# Patient Record
Sex: MALE | Race: WHITE | Employment: FULL TIME | URBAN - METROPOLITAN AREA
[De-identification: names, ages, dates, MRNs, and addresses within clinical notes are randomized per-mention and may not be internally consistent; named-entity substitution may affect disease eponyms.]

---

## 2018-05-09 ENCOUNTER — HOSPITAL ENCOUNTER (OUTPATIENT)
Dept: PREADMISSION TESTING | Age: 53
Discharge: HOME | End: 2018-05-13
Payer: COMMERCIAL

## 2018-05-09 VITALS
RESPIRATION RATE: 16 BRPM | TEMPERATURE: 98.3 F | HEART RATE: 72 BPM | HEIGHT: 69 IN | WEIGHT: 201.06 LBS | DIASTOLIC BLOOD PRESSURE: 84 MMHG | BODY MASS INDEX: 29.78 KG/M2 | SYSTOLIC BLOOD PRESSURE: 147 MMHG | OXYGEN SATURATION: 96 %

## 2018-05-09 LAB
ABO/RH: NORMAL
ANTIBODY SCREEN: NEGATIVE
ARM BAND NUMBER: NORMAL
EXPIRATION DATE: NORMAL
HCT VFR BLD CALC: 43.5 % (ref 40.7–50.3)
HEMOGLOBIN: 14.2 G/DL (ref 13–17)
MCH RBC QN AUTO: 28.1 PG (ref 25.2–33.5)
MCHC RBC AUTO-ENTMCNC: 32.6 G/DL (ref 28.4–34.8)
MCV RBC AUTO: 86 FL (ref 82.6–102.9)
NRBC AUTOMATED: 0 PER 100 WBC
PDW BLD-RTO: 12.5 % (ref 11.8–14.4)
PLATELET # BLD: 215 K/UL (ref 138–453)
PMV BLD AUTO: 10.4 FL (ref 8.1–13.5)
RBC # BLD: 5.06 M/UL (ref 4.21–5.77)
WBC # BLD: 7.5 K/UL (ref 3.5–11.3)

## 2018-05-09 PROCEDURE — 86850 RBC ANTIBODY SCREEN: CPT

## 2018-05-09 PROCEDURE — 87086 URINE CULTURE/COLONY COUNT: CPT

## 2018-05-09 PROCEDURE — 85027 COMPLETE CBC AUTOMATED: CPT

## 2018-05-09 PROCEDURE — 86900 BLOOD TYPING SEROLOGIC ABO: CPT

## 2018-05-09 PROCEDURE — 86901 BLOOD TYPING SEROLOGIC RH(D): CPT

## 2018-05-09 PROCEDURE — 36415 COLL VENOUS BLD VENIPUNCTURE: CPT

## 2018-05-09 RX ORDER — SODIUM CHLORIDE, SODIUM LACTATE, POTASSIUM CHLORIDE, CALCIUM CHLORIDE 600; 310; 30; 20 MG/100ML; MG/100ML; MG/100ML; MG/100ML
1000 INJECTION, SOLUTION INTRAVENOUS CONTINUOUS
Status: CANCELLED | OUTPATIENT
Start: 2018-05-09

## 2018-05-10 LAB
CULTURE: NO GROWTH
CULTURE: NORMAL
Lab: NORMAL
SPECIMEN DESCRIPTION: NORMAL
STATUS: NORMAL

## 2018-05-23 ENCOUNTER — ANESTHESIA (OUTPATIENT)
Dept: OPERATING ROOM | Age: 53
DRG: 708 | End: 2018-05-23
Payer: COMMERCIAL

## 2018-05-23 ENCOUNTER — HOSPITAL ENCOUNTER (INPATIENT)
Age: 53
LOS: 1 days | Discharge: HOME | DRG: 708 | End: 2018-05-24
Attending: UROLOGY | Admitting: UROLOGY
Payer: COMMERCIAL

## 2018-05-23 ENCOUNTER — ANESTHESIA EVENT (OUTPATIENT)
Dept: OPERATING ROOM | Age: 53
DRG: 708 | End: 2018-05-23
Payer: COMMERCIAL

## 2018-05-23 VITALS — OXYGEN SATURATION: 100 % | TEMPERATURE: 97.2 F | SYSTOLIC BLOOD PRESSURE: 156 MMHG | DIASTOLIC BLOOD PRESSURE: 95 MMHG

## 2018-05-23 DIAGNOSIS — C61 PROSTATE CANCER (HCC): Primary | ICD-10-CM

## 2018-05-23 LAB
ANION GAP SERPL CALCULATED.3IONS-SCNC: 10 MMOL/L (ref 9–17)
BUN BLDV-MCNC: 15 MG/DL (ref 6–20)
BUN/CREAT BLD: ABNORMAL (ref 9–20)
CALCIUM SERPL-MCNC: 8.8 MG/DL (ref 8.6–10.4)
CHLORIDE BLD-SCNC: 107 MMOL/L (ref 98–107)
CO2: 26 MMOL/L (ref 20–31)
CREAT SERPL-MCNC: 0.81 MG/DL (ref 0.7–1.2)
GFR AFRICAN AMERICAN: >60 ML/MIN
GFR NON-AFRICAN AMERICAN: >60 ML/MIN
GFR SERPL CREATININE-BSD FRML MDRD: ABNORMAL ML/MIN/{1.73_M2}
GFR SERPL CREATININE-BSD FRML MDRD: ABNORMAL ML/MIN/{1.73_M2}
GLUCOSE BLD-MCNC: 113 MG/DL (ref 70–99)
HCT VFR BLD CALC: 42.6 % (ref 40.7–50.3)
HEMOGLOBIN: 14.3 G/DL (ref 13–17)
POTASSIUM SERPL-SCNC: 3.9 MMOL/L (ref 3.7–5.3)
SODIUM BLD-SCNC: 143 MMOL/L (ref 135–144)

## 2018-05-23 PROCEDURE — 87086 URINE CULTURE/COLONY COUNT: CPT

## 2018-05-23 PROCEDURE — 6360000002 HC RX W HCPCS: Performed by: UROLOGY

## 2018-05-23 PROCEDURE — 2500000003 HC RX 250 WO HCPCS: Performed by: NURSE ANESTHETIST, CERTIFIED REGISTERED

## 2018-05-23 PROCEDURE — 8E0W4CZ ROBOTIC ASSISTED PROCEDURE OF TRUNK REGION, PERCUTANEOUS ENDOSCOPIC APPROACH: ICD-10-PCS | Performed by: UROLOGY

## 2018-05-23 PROCEDURE — 80048 BASIC METABOLIC PNL TOTAL CA: CPT

## 2018-05-23 PROCEDURE — 3600000009 HC SURGERY ROBOT BASE: Performed by: UROLOGY

## 2018-05-23 PROCEDURE — 6360000002 HC RX W HCPCS: Performed by: ANESTHESIOLOGY

## 2018-05-23 PROCEDURE — 7100000001 HC PACU RECOVERY - ADDTL 15 MIN: Performed by: UROLOGY

## 2018-05-23 PROCEDURE — 7100000000 HC PACU RECOVERY - FIRST 15 MIN: Performed by: UROLOGY

## 2018-05-23 PROCEDURE — 2720000003 HC MISC SUTURE/STAPLES/RELOADS/ETC: Performed by: UROLOGY

## 2018-05-23 PROCEDURE — 6360000002 HC RX W HCPCS: Performed by: NURSE ANESTHETIST, CERTIFIED REGISTERED

## 2018-05-23 PROCEDURE — 2780000010 HC IMPLANT OTHER: Performed by: UROLOGY

## 2018-05-23 PROCEDURE — 85014 HEMATOCRIT: CPT

## 2018-05-23 PROCEDURE — 2580000003 HC RX 258: Performed by: UROLOGY

## 2018-05-23 PROCEDURE — 3700000000 HC ANESTHESIA ATTENDED CARE: Performed by: UROLOGY

## 2018-05-23 PROCEDURE — S2900 ROBOTIC SURGICAL SYSTEM: HCPCS | Performed by: UROLOGY

## 2018-05-23 PROCEDURE — 07BC4ZX EXCISION OF PELVIS LYMPHATIC, PERCUTANEOUS ENDOSCOPIC APPROACH, DIAGNOSTIC: ICD-10-PCS | Performed by: UROLOGY

## 2018-05-23 PROCEDURE — 0VT04ZZ RESECTION OF PROSTATE, PERCUTANEOUS ENDOSCOPIC APPROACH: ICD-10-PCS | Performed by: UROLOGY

## 2018-05-23 PROCEDURE — 2580000003 HC RX 258: Performed by: NURSE ANESTHETIST, CERTIFIED REGISTERED

## 2018-05-23 PROCEDURE — 88307 TISSUE EXAM BY PATHOLOGIST: CPT

## 2018-05-23 PROCEDURE — 85018 HEMOGLOBIN: CPT

## 2018-05-23 PROCEDURE — 2580000003 HC RX 258: Performed by: ANESTHESIOLOGY

## 2018-05-23 PROCEDURE — 6370000000 HC RX 637 (ALT 250 FOR IP): Performed by: UROLOGY

## 2018-05-23 PROCEDURE — 3700000001 HC ADD 15 MINUTES (ANESTHESIA): Performed by: UROLOGY

## 2018-05-23 PROCEDURE — 1200000000 HC SEMI PRIVATE

## 2018-05-23 PROCEDURE — 88309 TISSUE EXAM BY PATHOLOGIST: CPT

## 2018-05-23 PROCEDURE — 3600000019 HC SURGERY ROBOT ADDTL 15MIN: Performed by: UROLOGY

## 2018-05-23 RX ORDER — ONDANSETRON 2 MG/ML
4 INJECTION INTRAMUSCULAR; INTRAVENOUS
Status: DISCONTINUED | OUTPATIENT
Start: 2018-05-23 | End: 2018-05-23 | Stop reason: HOSPADM

## 2018-05-23 RX ORDER — KETOROLAC TROMETHAMINE 30 MG/ML
30 INJECTION, SOLUTION INTRAMUSCULAR; INTRAVENOUS EVERY 6 HOURS
Status: DISCONTINUED | OUTPATIENT
Start: 2018-05-23 | End: 2018-05-24 | Stop reason: HOSPADM

## 2018-05-23 RX ORDER — FENTANYL CITRATE 50 UG/ML
INJECTION, SOLUTION INTRAMUSCULAR; INTRAVENOUS PRN
Status: DISCONTINUED | OUTPATIENT
Start: 2018-05-23 | End: 2018-05-23 | Stop reason: SDUPTHER

## 2018-05-23 RX ORDER — GLYCOPYRROLATE 1 MG/5 ML
SYRINGE (ML) INTRAVENOUS PRN
Status: DISCONTINUED | OUTPATIENT
Start: 2018-05-23 | End: 2018-05-23 | Stop reason: SDUPTHER

## 2018-05-23 RX ORDER — MAGNESIUM HYDROXIDE 1200 MG/15ML
LIQUID ORAL CONTINUOUS PRN
Status: COMPLETED | OUTPATIENT
Start: 2018-05-23 | End: 2018-05-23

## 2018-05-23 RX ORDER — SODIUM CHLORIDE, SODIUM LACTATE, POTASSIUM CHLORIDE, CALCIUM CHLORIDE 600; 310; 30; 20 MG/100ML; MG/100ML; MG/100ML; MG/100ML
INJECTION, SOLUTION INTRAVENOUS CONTINUOUS PRN
Status: DISCONTINUED | OUTPATIENT
Start: 2018-05-23 | End: 2018-05-23 | Stop reason: SDUPTHER

## 2018-05-23 RX ORDER — CEFAZOLIN SODIUM 1 G/50ML
1 INJECTION, SOLUTION INTRAVENOUS EVERY 8 HOURS
Status: DISCONTINUED | OUTPATIENT
Start: 2018-05-23 | End: 2018-05-24 | Stop reason: HOSPADM

## 2018-05-23 RX ORDER — OXYCODONE HYDROCHLORIDE AND ACETAMINOPHEN 5; 325 MG/1; MG/1
1 TABLET ORAL EVERY 4 HOURS PRN
Status: DISCONTINUED | OUTPATIENT
Start: 2018-05-23 | End: 2018-05-24 | Stop reason: HOSPADM

## 2018-05-23 RX ORDER — NEOSTIGMINE METHYLSULFATE 5 MG/5 ML
SYRINGE (ML) INTRAVENOUS PRN
Status: DISCONTINUED | OUTPATIENT
Start: 2018-05-23 | End: 2018-05-23 | Stop reason: SDUPTHER

## 2018-05-23 RX ORDER — ACETAMINOPHEN 325 MG/1
650 TABLET ORAL EVERY 4 HOURS PRN
Status: DISCONTINUED | OUTPATIENT
Start: 2018-05-23 | End: 2018-05-24 | Stop reason: HOSPADM

## 2018-05-23 RX ORDER — PROPOFOL 10 MG/ML
INJECTION, EMULSION INTRAVENOUS PRN
Status: DISCONTINUED | OUTPATIENT
Start: 2018-05-23 | End: 2018-05-23 | Stop reason: SDUPTHER

## 2018-05-23 RX ORDER — OXYCODONE HYDROCHLORIDE AND ACETAMINOPHEN 5; 325 MG/1; MG/1
2 TABLET ORAL EVERY 4 HOURS PRN
Status: DISCONTINUED | OUTPATIENT
Start: 2018-05-23 | End: 2018-05-24 | Stop reason: HOSPADM

## 2018-05-23 RX ORDER — MIDAZOLAM HYDROCHLORIDE 1 MG/ML
INJECTION INTRAMUSCULAR; INTRAVENOUS PRN
Status: DISCONTINUED | OUTPATIENT
Start: 2018-05-23 | End: 2018-05-23 | Stop reason: SDUPTHER

## 2018-05-23 RX ORDER — DIPHENHYDRAMINE HYDROCHLORIDE 50 MG/ML
12.5 INJECTION INTRAMUSCULAR; INTRAVENOUS
Status: DISCONTINUED | OUTPATIENT
Start: 2018-05-23 | End: 2018-05-23 | Stop reason: HOSPADM

## 2018-05-23 RX ORDER — ONDANSETRON 2 MG/ML
INJECTION INTRAMUSCULAR; INTRAVENOUS PRN
Status: DISCONTINUED | OUTPATIENT
Start: 2018-05-23 | End: 2018-05-23 | Stop reason: SDUPTHER

## 2018-05-23 RX ORDER — LIDOCAINE HYDROCHLORIDE 10 MG/ML
INJECTION, SOLUTION EPIDURAL; INFILTRATION; INTRACAUDAL; PERINEURAL PRN
Status: DISCONTINUED | OUTPATIENT
Start: 2018-05-23 | End: 2018-05-23 | Stop reason: SDUPTHER

## 2018-05-23 RX ORDER — FAMOTIDINE 20 MG/1
20 TABLET, FILM COATED ORAL 2 TIMES DAILY
Status: DISCONTINUED | OUTPATIENT
Start: 2018-05-23 | End: 2018-05-24 | Stop reason: HOSPADM

## 2018-05-23 RX ORDER — HEPARIN SODIUM 5000 [USP'U]/ML
5000 INJECTION, SOLUTION INTRAVENOUS; SUBCUTANEOUS ONCE
Status: COMPLETED | OUTPATIENT
Start: 2018-05-23 | End: 2018-05-23

## 2018-05-23 RX ORDER — FENTANYL CITRATE 50 UG/ML
50 INJECTION, SOLUTION INTRAMUSCULAR; INTRAVENOUS EVERY 5 MIN PRN
Status: COMPLETED | OUTPATIENT
Start: 2018-05-23 | End: 2018-05-23

## 2018-05-23 RX ORDER — DOCUSATE SODIUM 100 MG/1
100 CAPSULE, LIQUID FILLED ORAL 2 TIMES DAILY
Status: DISCONTINUED | OUTPATIENT
Start: 2018-05-23 | End: 2018-05-24 | Stop reason: HOSPADM

## 2018-05-23 RX ORDER — DEXAMETHASONE SODIUM PHOSPHATE 10 MG/ML
INJECTION INTRAMUSCULAR; INTRAVENOUS PRN
Status: DISCONTINUED | OUTPATIENT
Start: 2018-05-23 | End: 2018-05-23 | Stop reason: SDUPTHER

## 2018-05-23 RX ORDER — KETOROLAC TROMETHAMINE 30 MG/ML
INJECTION, SOLUTION INTRAMUSCULAR; INTRAVENOUS PRN
Status: DISCONTINUED | OUTPATIENT
Start: 2018-05-23 | End: 2018-05-23 | Stop reason: SDUPTHER

## 2018-05-23 RX ORDER — SODIUM CHLORIDE 9 MG/ML
INJECTION, SOLUTION INTRAVENOUS CONTINUOUS
Status: DISCONTINUED | OUTPATIENT
Start: 2018-05-23 | End: 2018-05-24

## 2018-05-23 RX ORDER — ONDANSETRON 2 MG/ML
4 INJECTION INTRAMUSCULAR; INTRAVENOUS EVERY 6 HOURS PRN
Status: DISCONTINUED | OUTPATIENT
Start: 2018-05-23 | End: 2018-05-24 | Stop reason: HOSPADM

## 2018-05-23 RX ORDER — SODIUM CHLORIDE 0.9 % (FLUSH) 0.9 %
10 SYRINGE (ML) INJECTION PRN
Status: DISCONTINUED | OUTPATIENT
Start: 2018-05-23 | End: 2018-05-24 | Stop reason: HOSPADM

## 2018-05-23 RX ORDER — FENTANYL CITRATE 50 UG/ML
25 INJECTION, SOLUTION INTRAMUSCULAR; INTRAVENOUS EVERY 5 MIN PRN
Status: DISCONTINUED | OUTPATIENT
Start: 2018-05-23 | End: 2018-05-23 | Stop reason: HOSPADM

## 2018-05-23 RX ORDER — ULTRASOUND COUPLING MEDIUM
GEL (GRAM) TOPICAL PRN
Status: DISCONTINUED | OUTPATIENT
Start: 2018-05-23 | End: 2018-05-23 | Stop reason: HOSPADM

## 2018-05-23 RX ORDER — MIDAZOLAM HYDROCHLORIDE 1 MG/ML
1 INJECTION INTRAMUSCULAR; INTRAVENOUS EVERY 5 MIN PRN
Status: DISCONTINUED | OUTPATIENT
Start: 2018-05-23 | End: 2018-05-24 | Stop reason: HOSPADM

## 2018-05-23 RX ORDER — SODIUM CHLORIDE, SODIUM LACTATE, POTASSIUM CHLORIDE, CALCIUM CHLORIDE 600; 310; 30; 20 MG/100ML; MG/100ML; MG/100ML; MG/100ML
1000 INJECTION, SOLUTION INTRAVENOUS CONTINUOUS
Status: DISCONTINUED | OUTPATIENT
Start: 2018-05-23 | End: 2018-05-23

## 2018-05-23 RX ORDER — HEPARIN SODIUM 5000 [USP'U]/ML
5000 INJECTION, SOLUTION INTRAVENOUS; SUBCUTANEOUS EVERY 8 HOURS SCHEDULED
Status: DISCONTINUED | OUTPATIENT
Start: 2018-05-23 | End: 2018-05-24 | Stop reason: HOSPADM

## 2018-05-23 RX ORDER — SODIUM CHLORIDE 0.9 % (FLUSH) 0.9 %
10 SYRINGE (ML) INJECTION EVERY 12 HOURS SCHEDULED
Status: DISCONTINUED | OUTPATIENT
Start: 2018-05-23 | End: 2018-05-24 | Stop reason: HOSPADM

## 2018-05-23 RX ORDER — ROCURONIUM BROMIDE 10 MG/ML
INJECTION, SOLUTION INTRAVENOUS PRN
Status: DISCONTINUED | OUTPATIENT
Start: 2018-05-23 | End: 2018-05-23 | Stop reason: SDUPTHER

## 2018-05-23 RX ADMIN — FENTANYL CITRATE 50 MCG: 50 INJECTION INTRAMUSCULAR; INTRAVENOUS at 14:00

## 2018-05-23 RX ADMIN — FENTANYL CITRATE 50 MCG: 50 INJECTION INTRAMUSCULAR; INTRAVENOUS at 11:41

## 2018-05-23 RX ADMIN — OXYCODONE HYDROCHLORIDE AND ACETAMINOPHEN 2 TABLET: 5; 325 TABLET ORAL at 22:36

## 2018-05-23 RX ADMIN — FENTANYL CITRATE 50 MCG: 50 INJECTION INTRAMUSCULAR; INTRAVENOUS at 13:53

## 2018-05-23 RX ADMIN — OXYCODONE HYDROCHLORIDE AND ACETAMINOPHEN 1 TABLET: 5; 325 TABLET ORAL at 18:17

## 2018-05-23 RX ADMIN — CEFAZOLIN SODIUM 1 G: 1 INJECTION, SOLUTION INTRAVENOUS at 18:53

## 2018-05-23 RX ADMIN — KETOROLAC TROMETHAMINE 30 MG: 30 INJECTION, SOLUTION INTRAMUSCULAR; INTRAVENOUS at 13:13

## 2018-05-23 RX ADMIN — SODIUM CHLORIDE: 9 INJECTION, SOLUTION INTRAVENOUS at 23:30

## 2018-05-23 RX ADMIN — ROCURONIUM BROMIDE 20 MG: 10 INJECTION INTRAVENOUS at 12:14

## 2018-05-23 RX ADMIN — ONDANSETRON 4 MG: 2 INJECTION INTRAMUSCULAR; INTRAVENOUS at 13:06

## 2018-05-23 RX ADMIN — Medication 4 MG: at 13:10

## 2018-05-23 RX ADMIN — DEXAMETHASONE SODIUM PHOSPHATE 10 MG: 10 INJECTION INTRAMUSCULAR; INTRAVENOUS at 10:54

## 2018-05-23 RX ADMIN — HEPARIN SODIUM 5000 UNITS: 5000 INJECTION, SOLUTION INTRAVENOUS; SUBCUTANEOUS at 22:11

## 2018-05-23 RX ADMIN — DOCUSATE SODIUM 100 MG: 100 CAPSULE ORAL at 20:09

## 2018-05-23 RX ADMIN — PROPOFOL 200 MG: 10 INJECTION, EMULSION INTRAVENOUS at 10:29

## 2018-05-23 RX ADMIN — KETOROLAC TROMETHAMINE 30 MG: 30 INJECTION, SOLUTION INTRAMUSCULAR at 18:50

## 2018-05-23 RX ADMIN — OXYCODONE HYDROCHLORIDE AND ACETAMINOPHEN 1 TABLET: 5; 325 TABLET ORAL at 16:16

## 2018-05-23 RX ADMIN — SODIUM CHLORIDE: 9 INJECTION, SOLUTION INTRAVENOUS at 14:53

## 2018-05-23 RX ADMIN — HEPARIN SODIUM 5000 UNITS: 5000 INJECTION, SOLUTION INTRAVENOUS; SUBCUTANEOUS at 09:45

## 2018-05-23 RX ADMIN — FENTANYL CITRATE 25 MCG: 50 INJECTION INTRAMUSCULAR; INTRAVENOUS at 13:28

## 2018-05-23 RX ADMIN — SODIUM CHLORIDE, POTASSIUM CHLORIDE, SODIUM LACTATE AND CALCIUM CHLORIDE: 600; 310; 30; 20 INJECTION, SOLUTION INTRAVENOUS at 10:30

## 2018-05-23 RX ADMIN — MIDAZOLAM HYDROCHLORIDE 2 MG: 1 INJECTION, SOLUTION INTRAMUSCULAR; INTRAVENOUS at 10:24

## 2018-05-23 RX ADMIN — FENTANYL CITRATE 50 MCG: 50 INJECTION INTRAMUSCULAR; INTRAVENOUS at 11:13

## 2018-05-23 RX ADMIN — FENTANYL CITRATE 50 MCG: 50 INJECTION INTRAMUSCULAR; INTRAVENOUS at 13:48

## 2018-05-23 RX ADMIN — Medication 10 ML: at 20:22

## 2018-05-23 RX ADMIN — ROCURONIUM BROMIDE 10 MG: 10 INJECTION INTRAVENOUS at 12:46

## 2018-05-23 RX ADMIN — CEFAZOLIN SODIUM 2 G: 1 INJECTION, SOLUTION INTRAVENOUS at 11:03

## 2018-05-23 RX ADMIN — ROCURONIUM BROMIDE 50 MG: 10 INJECTION INTRAVENOUS at 10:29

## 2018-05-23 RX ADMIN — FENTANYL CITRATE 25 MCG: 50 INJECTION INTRAMUSCULAR; INTRAVENOUS at 13:20

## 2018-05-23 RX ADMIN — LIDOCAINE HYDROCHLORIDE 50 MG: 10 INJECTION, SOLUTION EPIDURAL; INFILTRATION; INTRACAUDAL; PERINEURAL at 10:29

## 2018-05-23 RX ADMIN — FAMOTIDINE 20 MG: 20 TABLET, FILM COATED ORAL at 20:09

## 2018-05-23 RX ADMIN — ROCURONIUM BROMIDE 10 MG: 10 INJECTION INTRAVENOUS at 11:54

## 2018-05-23 RX ADMIN — FENTANYL CITRATE 100 MCG: 50 INJECTION INTRAMUSCULAR; INTRAVENOUS at 10:29

## 2018-05-23 RX ADMIN — ROCURONIUM BROMIDE 20 MG: 10 INJECTION INTRAVENOUS at 11:13

## 2018-05-23 RX ADMIN — Medication 0.6 MG: at 13:10

## 2018-05-23 RX ADMIN — SODIUM CHLORIDE, POTASSIUM CHLORIDE, SODIUM LACTATE AND CALCIUM CHLORIDE 1000 ML: 600; 310; 30; 20 INJECTION, SOLUTION INTRAVENOUS at 08:46

## 2018-05-23 RX ADMIN — FENTANYL CITRATE 50 MCG: 50 INJECTION INTRAMUSCULAR; INTRAVENOUS at 14:05

## 2018-05-23 ASSESSMENT — PULMONARY FUNCTION TESTS
PIF_VALUE: 1
PIF_VALUE: 33
PIF_VALUE: 32
PIF_VALUE: 2
PIF_VALUE: 31
PIF_VALUE: 32
PIF_VALUE: 31
PIF_VALUE: 16
PIF_VALUE: 16
PIF_VALUE: 31
PIF_VALUE: 16
PIF_VALUE: 31
PIF_VALUE: 1
PIF_VALUE: 32
PIF_VALUE: 31
PIF_VALUE: 18
PIF_VALUE: 31
PIF_VALUE: 31
PIF_VALUE: 16
PIF_VALUE: 16
PIF_VALUE: 30
PIF_VALUE: 31
PIF_VALUE: 16
PIF_VALUE: 32
PIF_VALUE: 16
PIF_VALUE: 31
PIF_VALUE: 32
PIF_VALUE: 31
PIF_VALUE: 32
PIF_VALUE: 33
PIF_VALUE: 31
PIF_VALUE: 30
PIF_VALUE: 32
PIF_VALUE: 33
PIF_VALUE: 32
PIF_VALUE: 32
PIF_VALUE: 31
PIF_VALUE: 31
PIF_VALUE: 24
PIF_VALUE: 1
PIF_VALUE: 0
PIF_VALUE: 32
PIF_VALUE: 32
PIF_VALUE: 21
PIF_VALUE: 31
PIF_VALUE: 32
PIF_VALUE: 14
PIF_VALUE: 31
PIF_VALUE: 31
PIF_VALUE: 32
PIF_VALUE: 33
PIF_VALUE: 32
PIF_VALUE: 31
PIF_VALUE: 32
PIF_VALUE: 23
PIF_VALUE: 1
PIF_VALUE: 33
PIF_VALUE: 23
PIF_VALUE: 22
PIF_VALUE: 15
PIF_VALUE: 20
PIF_VALUE: 33
PIF_VALUE: 23
PIF_VALUE: 16
PIF_VALUE: 20
PIF_VALUE: 32
PIF_VALUE: 32
PIF_VALUE: 20
PIF_VALUE: 15
PIF_VALUE: 32
PIF_VALUE: 31
PIF_VALUE: 29
PIF_VALUE: 32
PIF_VALUE: 21
PIF_VALUE: 29
PIF_VALUE: 30
PIF_VALUE: 31
PIF_VALUE: 31
PIF_VALUE: 30
PIF_VALUE: 16
PIF_VALUE: 31
PIF_VALUE: 31
PIF_VALUE: 6
PIF_VALUE: 31
PIF_VALUE: 20
PIF_VALUE: 15
PIF_VALUE: 31
PIF_VALUE: 31
PIF_VALUE: 32
PIF_VALUE: 15
PIF_VALUE: 21
PIF_VALUE: 32
PIF_VALUE: 21
PIF_VALUE: 32
PIF_VALUE: 32
PIF_VALUE: 31
PIF_VALUE: 32
PIF_VALUE: 23
PIF_VALUE: 31
PIF_VALUE: 16
PIF_VALUE: 31
PIF_VALUE: 16
PIF_VALUE: 30
PIF_VALUE: 16
PIF_VALUE: 31
PIF_VALUE: 16
PIF_VALUE: 32
PIF_VALUE: 31
PIF_VALUE: 16
PIF_VALUE: 31
PIF_VALUE: 32
PIF_VALUE: 31
PIF_VALUE: 32
PIF_VALUE: 16
PIF_VALUE: 15
PIF_VALUE: 31
PIF_VALUE: 32
PIF_VALUE: 31
PIF_VALUE: 23
PIF_VALUE: 31
PIF_VALUE: 31
PIF_VALUE: 32
PIF_VALUE: 31
PIF_VALUE: 15
PIF_VALUE: 16
PIF_VALUE: 15
PIF_VALUE: 1
PIF_VALUE: 16
PIF_VALUE: 16
PIF_VALUE: 31
PIF_VALUE: 16
PIF_VALUE: 16
PIF_VALUE: 32
PIF_VALUE: 30
PIF_VALUE: 32
PIF_VALUE: 32
PIF_VALUE: 31
PIF_VALUE: 32
PIF_VALUE: 15
PIF_VALUE: 3
PIF_VALUE: 21
PIF_VALUE: 31
PIF_VALUE: 20
PIF_VALUE: 32
PIF_VALUE: 32
PIF_VALUE: 31
PIF_VALUE: 16
PIF_VALUE: 34
PIF_VALUE: 16
PIF_VALUE: 27
PIF_VALUE: 18
PIF_VALUE: 33
PIF_VALUE: 32
PIF_VALUE: 32
PIF_VALUE: 31
PIF_VALUE: 16
PIF_VALUE: 32
PIF_VALUE: 32
PIF_VALUE: 31
PIF_VALUE: 16
PIF_VALUE: 32
PIF_VALUE: 16
PIF_VALUE: 32
PIF_VALUE: 23
PIF_VALUE: 32
PIF_VALUE: 15
PIF_VALUE: 31
PIF_VALUE: 32
PIF_VALUE: 15
PIF_VALUE: 32
PIF_VALUE: 32
PIF_VALUE: 30
PIF_VALUE: 31
PIF_VALUE: 31
PIF_VALUE: 1
PIF_VALUE: 31

## 2018-05-23 ASSESSMENT — PAIN SCALES - GENERAL
PAINLEVEL_OUTOF10: 8
PAINLEVEL_OUTOF10: 7
PAINLEVEL_OUTOF10: 9
PAINLEVEL_OUTOF10: 6
PAINLEVEL_OUTOF10: 5
PAINLEVEL_OUTOF10: 8
PAINLEVEL_OUTOF10: 9
PAINLEVEL_OUTOF10: 5
PAINLEVEL_OUTOF10: 9
PAINLEVEL_OUTOF10: 8
PAINLEVEL_OUTOF10: 8

## 2018-05-23 ASSESSMENT — PAIN DESCRIPTION - PROGRESSION
CLINICAL_PROGRESSION: GRADUALLY IMPROVING
CLINICAL_PROGRESSION: NOT CHANGED
CLINICAL_PROGRESSION: NOT CHANGED
CLINICAL_PROGRESSION: GRADUALLY IMPROVING

## 2018-05-23 ASSESSMENT — PAIN DESCRIPTION - PAIN TYPE
TYPE: SURGICAL PAIN
TYPE: SURGICAL PAIN

## 2018-05-23 ASSESSMENT — ENCOUNTER SYMPTOMS
STRIDOR: 0
SHORTNESS OF BREATH: 0

## 2018-05-23 ASSESSMENT — PAIN DESCRIPTION - ONSET: ONSET: AWAKENED FROM SLEEP

## 2018-05-23 ASSESSMENT — PAIN DESCRIPTION - LOCATION
LOCATION: ABDOMEN
LOCATION: ABDOMEN

## 2018-05-23 ASSESSMENT — PAIN DESCRIPTION - DESCRIPTORS
DESCRIPTORS: PENETRATING;PRESSURE;SORE
DESCRIPTORS: ACHING

## 2018-05-23 ASSESSMENT — PAIN DESCRIPTION - FREQUENCY: FREQUENCY: CONTINUOUS

## 2018-05-23 ASSESSMENT — PAIN - FUNCTIONAL ASSESSMENT: PAIN_FUNCTIONAL_ASSESSMENT: 0-10

## 2018-05-24 VITALS
TEMPERATURE: 98.3 F | RESPIRATION RATE: 16 BRPM | BODY MASS INDEX: 29.09 KG/M2 | SYSTOLIC BLOOD PRESSURE: 115 MMHG | HEART RATE: 82 BPM | HEIGHT: 69 IN | DIASTOLIC BLOOD PRESSURE: 74 MMHG | WEIGHT: 196.43 LBS | OXYGEN SATURATION: 95 %

## 2018-05-24 LAB
ANION GAP SERPL CALCULATED.3IONS-SCNC: 10 MMOL/L (ref 9–17)
BUN BLDV-MCNC: 14 MG/DL (ref 6–20)
BUN/CREAT BLD: ABNORMAL (ref 9–20)
CALCIUM SERPL-MCNC: 7.8 MG/DL (ref 8.6–10.4)
CHLORIDE BLD-SCNC: 103 MMOL/L (ref 98–107)
CO2: 26 MMOL/L (ref 20–31)
CREAT SERPL-MCNC: 0.86 MG/DL (ref 0.7–1.2)
CULTURE: NO GROWTH
CULTURE: NORMAL
GFR AFRICAN AMERICAN: >60 ML/MIN
GFR NON-AFRICAN AMERICAN: >60 ML/MIN
GFR SERPL CREATININE-BSD FRML MDRD: ABNORMAL ML/MIN/{1.73_M2}
GFR SERPL CREATININE-BSD FRML MDRD: ABNORMAL ML/MIN/{1.73_M2}
GLUCOSE BLD-MCNC: 107 MG/DL (ref 70–99)
HCT VFR BLD CALC: 34.1 % (ref 40.7–50.3)
HEMOGLOBIN: 11.4 G/DL (ref 13–17)
Lab: NORMAL
MCH RBC QN AUTO: 28.9 PG (ref 25.2–33.5)
MCHC RBC AUTO-ENTMCNC: 33.4 G/DL (ref 28.4–34.8)
MCV RBC AUTO: 86.5 FL (ref 82.6–102.9)
NRBC AUTOMATED: 0 PER 100 WBC
PDW BLD-RTO: 12.6 % (ref 11.8–14.4)
PLATELET # BLD: ABNORMAL K/UL (ref 138–453)
PLATELET, FLUORESCENCE: NORMAL K/UL (ref 138–453)
PLATELET, IMMATURE FRACTION: NORMAL % (ref 1.1–10.3)
PMV BLD AUTO: ABNORMAL FL (ref 8.1–13.5)
POTASSIUM SERPL-SCNC: 4.1 MMOL/L (ref 3.7–5.3)
RBC # BLD: 3.94 M/UL (ref 4.21–5.77)
SODIUM BLD-SCNC: 139 MMOL/L (ref 135–144)
SPECIMEN DESCRIPTION: NORMAL
STATUS: NORMAL
SURGICAL PATHOLOGY REPORT: NORMAL
WBC # BLD: 9.2 K/UL (ref 3.5–11.3)

## 2018-05-24 PROCEDURE — 6370000000 HC RX 637 (ALT 250 FOR IP): Performed by: UROLOGY

## 2018-05-24 PROCEDURE — 85027 COMPLETE CBC AUTOMATED: CPT

## 2018-05-24 PROCEDURE — 94762 N-INVAS EAR/PLS OXIMTRY CONT: CPT

## 2018-05-24 PROCEDURE — 2580000003 HC RX 258: Performed by: UROLOGY

## 2018-05-24 PROCEDURE — 80048 BASIC METABOLIC PNL TOTAL CA: CPT

## 2018-05-24 PROCEDURE — 36415 COLL VENOUS BLD VENIPUNCTURE: CPT

## 2018-05-24 PROCEDURE — 85055 RETICULATED PLATELET ASSAY: CPT

## 2018-05-24 PROCEDURE — 6360000002 HC RX W HCPCS: Performed by: UROLOGY

## 2018-05-24 RX ORDER — CIPROFLOXACIN 500 MG/1
500 TABLET, FILM COATED ORAL 2 TIMES DAILY
Qty: 6 TABLET | Refills: 0 | Status: SHIPPED | OUTPATIENT
Start: 2018-05-24 | End: 2018-05-27

## 2018-05-24 RX ORDER — PSEUDOEPHEDRINE HCL 30 MG
100 TABLET ORAL 2 TIMES DAILY
Qty: 60 CAPSULE | Refills: 0 | Status: SHIPPED | OUTPATIENT
Start: 2018-05-24 | End: 2020-08-15

## 2018-05-24 RX ORDER — OXYCODONE HYDROCHLORIDE AND ACETAMINOPHEN 5; 325 MG/1; MG/1
1-2 TABLET ORAL EVERY 6 HOURS PRN
Qty: 30 TABLET | Refills: 0 | Status: SHIPPED | OUTPATIENT
Start: 2018-05-24 | End: 2018-05-31

## 2018-05-24 RX ADMIN — DOCUSATE SODIUM 100 MG: 100 CAPSULE ORAL at 08:43

## 2018-05-24 RX ADMIN — CEFAZOLIN SODIUM 1 G: 1 INJECTION, SOLUTION INTRAVENOUS at 03:30

## 2018-05-24 RX ADMIN — KETOROLAC TROMETHAMINE 30 MG: 30 INJECTION, SOLUTION INTRAMUSCULAR at 13:39

## 2018-05-24 RX ADMIN — HEPARIN SODIUM 5000 UNITS: 5000 INJECTION, SOLUTION INTRAVENOUS; SUBCUTANEOUS at 05:56

## 2018-05-24 RX ADMIN — CEFAZOLIN SODIUM 1 G: 1 INJECTION, SOLUTION INTRAVENOUS at 11:41

## 2018-05-24 RX ADMIN — KETOROLAC TROMETHAMINE 30 MG: 30 INJECTION, SOLUTION INTRAMUSCULAR at 07:43

## 2018-05-24 RX ADMIN — FAMOTIDINE 20 MG: 20 TABLET, FILM COATED ORAL at 08:43

## 2018-05-24 RX ADMIN — Medication 10 ML: at 08:43

## 2018-05-24 RX ADMIN — OXYCODONE HYDROCHLORIDE AND ACETAMINOPHEN 2 TABLET: 5; 325 TABLET ORAL at 08:41

## 2018-05-24 RX ADMIN — KETOROLAC TROMETHAMINE 30 MG: 30 INJECTION, SOLUTION INTRAMUSCULAR at 00:47

## 2018-05-24 ASSESSMENT — PAIN SCALES - GENERAL
PAINLEVEL_OUTOF10: 3
PAINLEVEL_OUTOF10: 6
PAINLEVEL_OUTOF10: 7
PAINLEVEL_OUTOF10: 2

## 2018-05-24 ASSESSMENT — PAIN DESCRIPTION - PAIN TYPE: TYPE: SURGICAL PAIN

## 2018-05-24 ASSESSMENT — PAIN DESCRIPTION - DESCRIPTORS: DESCRIPTORS: ACHING

## 2018-05-24 ASSESSMENT — PAIN DESCRIPTION - LOCATION: LOCATION: ABDOMEN

## 2020-08-15 ENCOUNTER — HOSPITAL ENCOUNTER (INPATIENT)
Age: 55
LOS: 2 days | Discharge: HOME | DRG: 246 | End: 2020-08-17
Attending: EMERGENCY MEDICINE | Admitting: FAMILY MEDICINE
Payer: COMMERCIAL

## 2020-08-15 ENCOUNTER — APPOINTMENT (OUTPATIENT)
Dept: GENERAL RADIOLOGY | Age: 55
DRG: 246 | End: 2020-08-15
Payer: COMMERCIAL

## 2020-08-15 PROBLEM — I21.3 STEMI (ST ELEVATION MYOCARDIAL INFARCTION) (HCC): Status: ACTIVE | Noted: 2020-08-15

## 2020-08-15 LAB
ABSOLUTE EOS #: 0.18 K/UL (ref 0–0.4)
ABSOLUTE IMMATURE GRANULOCYTE: 0.18 K/UL (ref 0–0.3)
ABSOLUTE LYMPH #: 5.16 K/UL (ref 1–4.8)
ABSOLUTE MONO #: 1.25 K/UL (ref 0.2–0.8)
ANION GAP SERPL CALCULATED.3IONS-SCNC: 22 MMOL/L (ref 9–17)
BASOPHILS # BLD: 0 %
BASOPHILS ABSOLUTE: 0 K/UL (ref 0–0.2)
BUN BLDV-MCNC: 14 MG/DL (ref 6–20)
BUN/CREAT BLD: 16 (ref 9–20)
CALCIUM SERPL-MCNC: 10 MG/DL (ref 8.6–10.4)
CHLORIDE BLD-SCNC: 100 MMOL/L (ref 98–107)
CHOLESTEROL/HDL RATIO: 2.9
CHOLESTEROL: 124 MG/DL
CO2: 18 MMOL/L (ref 20–31)
CREAT SERPL-MCNC: 0.88 MG/DL (ref 0.7–1.2)
DIFFERENTIAL TYPE: ABNORMAL
EOSINOPHILS RELATIVE PERCENT: 1 % (ref 1–4)
GFR AFRICAN AMERICAN: >60 ML/MIN
GFR NON-AFRICAN AMERICAN: >60 ML/MIN
GFR SERPL CREATININE-BSD FRML MDRD: ABNORMAL ML/MIN/{1.73_M2}
GFR SERPL CREATININE-BSD FRML MDRD: ABNORMAL ML/MIN/{1.73_M2}
GLUCOSE BLD-MCNC: 175 MG/DL (ref 70–99)
HCT VFR BLD CALC: 43.9 % (ref 40.7–50.3)
HDLC SERPL-MCNC: 43 MG/DL
HEMOGLOBIN: 15 G/DL (ref 13–17)
IMMATURE GRANULOCYTES: 1 %
INR BLD: 1
LDL CHOLESTEROL: 68 MG/DL (ref 0–130)
LV EF: 35 %
LVEF MODALITY: NORMAL
LYMPHOCYTES # BLD: 29 % (ref 24–44)
MCH RBC QN AUTO: 30.1 PG (ref 25.2–33.5)
MCHC RBC AUTO-ENTMCNC: 34.2 G/DL (ref 28.4–34.8)
MCV RBC AUTO: 88.2 FL (ref 82.6–102.9)
MONOCYTES # BLD: 7 % (ref 1–7)
MYOGLOBIN: 148 NG/ML (ref 28–72)
MYOGLOBIN: 75 NG/ML (ref 28–72)
NRBC AUTOMATED: 0 PER 100 WBC
PARTIAL THROMBOPLASTIN TIME: 44.6 SEC (ref 23.9–33.8)
PDW BLD-RTO: 12.5 % (ref 11.8–14.4)
PLATELET # BLD: 343 K/UL (ref 138–453)
PLATELET ESTIMATE: ABNORMAL
PMV BLD AUTO: 10.2 FL (ref 8.1–13.5)
POTASSIUM SERPL-SCNC: 3.2 MMOL/L (ref 3.7–5.3)
PROTHROMBIN TIME: 13.1 SEC (ref 11.5–14.2)
RBC # BLD: 4.98 M/UL (ref 4.21–5.77)
RBC # BLD: ABNORMAL 10*6/UL
SEG NEUTROPHILS: 62 % (ref 36–66)
SEGMENTED NEUTROPHILS ABSOLUTE COUNT: 11.03 K/UL (ref 1.8–7.7)
SODIUM BLD-SCNC: 140 MMOL/L (ref 135–144)
TRIGL SERPL-MCNC: 63 MG/DL
TROPONIN INTERP: ABNORMAL
TROPONIN INTERP: ABNORMAL
TROPONIN T: ABNORMAL NG/ML
TROPONIN T: ABNORMAL NG/ML
TROPONIN, HIGH SENSITIVITY: 45 NG/L (ref 0–22)
TROPONIN, HIGH SENSITIVITY: 501 NG/L (ref 0–22)
VLDLC SERPL CALC-MCNC: NORMAL MG/DL (ref 1–30)
WBC # BLD: 17.8 K/UL (ref 3.5–11.3)
WBC # BLD: ABNORMAL 10*3/UL

## 2020-08-15 PROCEDURE — 93005 ELECTROCARDIOGRAM TRACING: CPT | Performed by: INTERNAL MEDICINE

## 2020-08-15 PROCEDURE — 84484 ASSAY OF TROPONIN QUANT: CPT

## 2020-08-15 PROCEDURE — 6370000000 HC RX 637 (ALT 250 FOR IP): Performed by: EMERGENCY MEDICINE

## 2020-08-15 PROCEDURE — 96374 THER/PROPH/DIAG INJ IV PUSH: CPT

## 2020-08-15 PROCEDURE — 2580000003 HC RX 258

## 2020-08-15 PROCEDURE — B2151ZZ FLUOROSCOPY OF LEFT HEART USING LOW OSMOLAR CONTRAST: ICD-10-PCS | Performed by: INTERNAL MEDICINE

## 2020-08-15 PROCEDURE — 92941 PRQ TRLML REVSC TOT OCCL AMI: CPT | Performed by: INTERNAL MEDICINE

## 2020-08-15 PROCEDURE — 80048 BASIC METABOLIC PNL TOTAL CA: CPT

## 2020-08-15 PROCEDURE — 4A023N7 MEASUREMENT OF CARDIAC SAMPLING AND PRESSURE, LEFT HEART, PERCUTANEOUS APPROACH: ICD-10-PCS | Performed by: INTERNAL MEDICINE

## 2020-08-15 PROCEDURE — 027137Z DILATION OF CORONARY ARTERY, TWO ARTERIES WITH FOUR OR MORE DRUG-ELUTING INTRALUMINAL DEVICES, PERCUTANEOUS APPROACH: ICD-10-PCS | Performed by: INTERNAL MEDICINE

## 2020-08-15 PROCEDURE — 2500000003 HC RX 250 WO HCPCS

## 2020-08-15 PROCEDURE — 2000000000 HC ICU R&B

## 2020-08-15 PROCEDURE — 6370000000 HC RX 637 (ALT 250 FOR IP)

## 2020-08-15 PROCEDURE — 83874 ASSAY OF MYOGLOBIN: CPT

## 2020-08-15 PROCEDURE — 71045 X-RAY EXAM CHEST 1 VIEW: CPT

## 2020-08-15 PROCEDURE — 6370000000 HC RX 637 (ALT 250 FOR IP): Performed by: INTERNAL MEDICINE

## 2020-08-15 PROCEDURE — 99285 EMERGENCY DEPT VISIT HI MDM: CPT

## 2020-08-15 PROCEDURE — 2580000003 HC RX 258: Performed by: FAMILY MEDICINE

## 2020-08-15 PROCEDURE — 96375 TX/PRO/DX INJ NEW DRUG ADDON: CPT

## 2020-08-15 PROCEDURE — 6370000000 HC RX 637 (ALT 250 FOR IP): Performed by: FAMILY MEDICINE

## 2020-08-15 PROCEDURE — 6360000002 HC RX W HCPCS: Performed by: EMERGENCY MEDICINE

## 2020-08-15 PROCEDURE — 93458 L HRT ARTERY/VENTRICLE ANGIO: CPT | Performed by: INTERNAL MEDICINE

## 2020-08-15 PROCEDURE — 85025 COMPLETE CBC W/AUTO DIFF WBC: CPT

## 2020-08-15 PROCEDURE — 2580000003 HC RX 258: Performed by: INTERNAL MEDICINE

## 2020-08-15 PROCEDURE — 85610 PROTHROMBIN TIME: CPT

## 2020-08-15 PROCEDURE — 92929 HC PRQ CARD STENT W/ANGIO ADDL: CPT | Performed by: INTERNAL MEDICINE

## 2020-08-15 PROCEDURE — 85730 THROMBOPLASTIN TIME PARTIAL: CPT

## 2020-08-15 PROCEDURE — 6360000004 HC RX CONTRAST MEDICATION

## 2020-08-15 PROCEDURE — 80061 LIPID PANEL: CPT

## 2020-08-15 PROCEDURE — 6360000002 HC RX W HCPCS

## 2020-08-15 PROCEDURE — 93005 ELECTROCARDIOGRAM TRACING: CPT | Performed by: EMERGENCY MEDICINE

## 2020-08-15 PROCEDURE — B2111ZZ FLUOROSCOPY OF MULTIPLE CORONARY ARTERIES USING LOW OSMOLAR CONTRAST: ICD-10-PCS | Performed by: INTERNAL MEDICINE

## 2020-08-15 PROCEDURE — 36415 COLL VENOUS BLD VENIPUNCTURE: CPT

## 2020-08-15 RX ORDER — MORPHINE SULFATE 4 MG/ML
4 INJECTION, SOLUTION INTRAMUSCULAR; INTRAVENOUS ONCE
Status: COMPLETED | OUTPATIENT
Start: 2020-08-15 | End: 2020-08-15

## 2020-08-15 RX ORDER — ATORVASTATIN CALCIUM 80 MG/1
80 TABLET, FILM COATED ORAL NIGHTLY
Status: DISCONTINUED | OUTPATIENT
Start: 2020-08-15 | End: 2020-08-17 | Stop reason: HOSPADM

## 2020-08-15 RX ORDER — SODIUM CHLORIDE 0.9 % (FLUSH) 0.9 %
10 SYRINGE (ML) INJECTION EVERY 12 HOURS SCHEDULED
Status: DISCONTINUED | OUTPATIENT
Start: 2020-08-15 | End: 2020-08-15 | Stop reason: SDUPTHER

## 2020-08-15 RX ORDER — PROMETHAZINE HYDROCHLORIDE 12.5 MG/1
12.5 TABLET ORAL EVERY 6 HOURS PRN
Status: DISCONTINUED | OUTPATIENT
Start: 2020-08-15 | End: 2020-08-17 | Stop reason: HOSPADM

## 2020-08-15 RX ORDER — ATROPINE SULFATE 0.4 MG/ML
0.5 AMPUL (ML) INJECTION
Status: DISPENSED | OUTPATIENT
Start: 2020-08-15 | End: 2020-08-15

## 2020-08-15 RX ORDER — NICOTINE 21 MG/24HR
1 PATCH, TRANSDERMAL 24 HOURS TRANSDERMAL DAILY PRN
Status: DISCONTINUED | OUTPATIENT
Start: 2020-08-15 | End: 2020-08-17 | Stop reason: HOSPADM

## 2020-08-15 RX ORDER — HEPARIN SODIUM 1000 [USP'U]/ML
4000 INJECTION, SOLUTION INTRAVENOUS; SUBCUTANEOUS ONCE
Status: COMPLETED | OUTPATIENT
Start: 2020-08-15 | End: 2020-08-15

## 2020-08-15 RX ORDER — LISINOPRIL 2.5 MG/1
2.5 TABLET ORAL DAILY
Status: DISCONTINUED | OUTPATIENT
Start: 2020-08-15 | End: 2020-08-17 | Stop reason: HOSPADM

## 2020-08-15 RX ORDER — ONDANSETRON 2 MG/ML
4 INJECTION INTRAMUSCULAR; INTRAVENOUS EVERY 6 HOURS PRN
Status: DISCONTINUED | OUTPATIENT
Start: 2020-08-15 | End: 2020-08-15 | Stop reason: SDUPTHER

## 2020-08-15 RX ORDER — SODIUM CHLORIDE 0.9 % (FLUSH) 0.9 %
10 SYRINGE (ML) INJECTION PRN
Status: DISCONTINUED | OUTPATIENT
Start: 2020-08-15 | End: 2020-08-17 | Stop reason: HOSPADM

## 2020-08-15 RX ORDER — MORPHINE SULFATE 2 MG/ML
2 INJECTION, SOLUTION INTRAMUSCULAR; INTRAVENOUS
Status: ACTIVE | OUTPATIENT
Start: 2020-08-15 | End: 2020-08-15

## 2020-08-15 RX ORDER — ONDANSETRON 2 MG/ML
4 INJECTION INTRAMUSCULAR; INTRAVENOUS ONCE
Status: COMPLETED | OUTPATIENT
Start: 2020-08-15 | End: 2020-08-15

## 2020-08-15 RX ORDER — ONDANSETRON 2 MG/ML
4 INJECTION INTRAMUSCULAR; INTRAVENOUS EVERY 6 HOURS PRN
Status: DISCONTINUED | OUTPATIENT
Start: 2020-08-15 | End: 2020-08-17 | Stop reason: HOSPADM

## 2020-08-15 RX ORDER — ASPIRIN 81 MG/1
81 TABLET ORAL DAILY
Status: DISCONTINUED | OUTPATIENT
Start: 2020-08-16 | End: 2020-08-17 | Stop reason: HOSPADM

## 2020-08-15 RX ORDER — CARVEDILOL 3.12 MG/1
3.12 TABLET ORAL 2 TIMES DAILY WITH MEALS
Status: DISCONTINUED | OUTPATIENT
Start: 2020-08-16 | End: 2020-08-16

## 2020-08-15 RX ORDER — FAMOTIDINE 20 MG/1
20 TABLET, FILM COATED ORAL 2 TIMES DAILY
Status: DISCONTINUED | OUTPATIENT
Start: 2020-08-15 | End: 2020-08-17 | Stop reason: HOSPADM

## 2020-08-15 RX ORDER — POTASSIUM CHLORIDE 7.45 MG/ML
10 INJECTION INTRAVENOUS PRN
Status: DISCONTINUED | OUTPATIENT
Start: 2020-08-15 | End: 2020-08-17 | Stop reason: HOSPADM

## 2020-08-15 RX ORDER — POTASSIUM CHLORIDE 20 MEQ/1
40 TABLET, EXTENDED RELEASE ORAL PRN
Status: DISCONTINUED | OUTPATIENT
Start: 2020-08-15 | End: 2020-08-17 | Stop reason: HOSPADM

## 2020-08-15 RX ORDER — ACETAMINOPHEN 325 MG/1
650 TABLET ORAL EVERY 4 HOURS PRN
Status: DISCONTINUED | OUTPATIENT
Start: 2020-08-15 | End: 2020-08-15 | Stop reason: SDUPTHER

## 2020-08-15 RX ORDER — ACETAMINOPHEN 650 MG/1
650 SUPPOSITORY RECTAL EVERY 6 HOURS PRN
Status: DISCONTINUED | OUTPATIENT
Start: 2020-08-15 | End: 2020-08-17 | Stop reason: HOSPADM

## 2020-08-15 RX ORDER — SODIUM CHLORIDE 0.9 % (FLUSH) 0.9 %
10 SYRINGE (ML) INJECTION PRN
Status: DISCONTINUED | OUTPATIENT
Start: 2020-08-15 | End: 2020-08-15 | Stop reason: SDUPTHER

## 2020-08-15 RX ORDER — ACETAMINOPHEN 325 MG/1
650 TABLET ORAL EVERY 6 HOURS PRN
Status: DISCONTINUED | OUTPATIENT
Start: 2020-08-15 | End: 2020-08-17 | Stop reason: HOSPADM

## 2020-08-15 RX ORDER — SODIUM CHLORIDE 9 MG/ML
INJECTION, SOLUTION INTRAVENOUS CONTINUOUS
Status: DISCONTINUED | OUTPATIENT
Start: 2020-08-15 | End: 2020-08-16

## 2020-08-15 RX ORDER — NITROGLYCERIN 0.4 MG/1
0.4 TABLET SUBLINGUAL EVERY 5 MIN PRN
Status: COMPLETED | OUTPATIENT
Start: 2020-08-15 | End: 2020-08-15

## 2020-08-15 RX ORDER — MULTIVITAMIN
1 TABLET ORAL DAILY
COMMUNITY

## 2020-08-15 RX ORDER — NICOTINE 21 MG/24HR
1 PATCH, TRANSDERMAL 24 HOURS TRANSDERMAL DAILY
Status: DISCONTINUED | OUTPATIENT
Start: 2020-08-15 | End: 2020-08-17 | Stop reason: HOSPADM

## 2020-08-15 RX ORDER — MAGNESIUM SULFATE 1 G/100ML
1 INJECTION INTRAVENOUS PRN
Status: DISCONTINUED | OUTPATIENT
Start: 2020-08-15 | End: 2020-08-17 | Stop reason: HOSPADM

## 2020-08-15 RX ORDER — HYDROCODONE BITARTRATE AND ACETAMINOPHEN 5; 325 MG/1; MG/1
1 TABLET ORAL EVERY 4 HOURS PRN
Status: DISCONTINUED | OUTPATIENT
Start: 2020-08-15 | End: 2020-08-17 | Stop reason: HOSPADM

## 2020-08-15 RX ORDER — SODIUM CHLORIDE 0.9 % (FLUSH) 0.9 %
10 SYRINGE (ML) INJECTION EVERY 12 HOURS SCHEDULED
Status: DISCONTINUED | OUTPATIENT
Start: 2020-08-15 | End: 2020-08-17 | Stop reason: HOSPADM

## 2020-08-15 RX ORDER — HYDRALAZINE HYDROCHLORIDE 20 MG/ML
10 INJECTION INTRAMUSCULAR; INTRAVENOUS EVERY 6 HOURS PRN
Status: DISCONTINUED | OUTPATIENT
Start: 2020-08-15 | End: 2020-08-17 | Stop reason: HOSPADM

## 2020-08-15 RX ORDER — ASPIRIN 81 MG/1
324 TABLET, CHEWABLE ORAL ONCE
Status: COMPLETED | OUTPATIENT
Start: 2020-08-15 | End: 2020-08-15

## 2020-08-15 RX ADMIN — MORPHINE SULFATE 4 MG: 4 INJECTION, SOLUTION INTRAMUSCULAR; INTRAVENOUS at 16:14

## 2020-08-15 RX ADMIN — ASPIRIN 324 MG: 81 TABLET, CHEWABLE ORAL at 16:03

## 2020-08-15 RX ADMIN — SODIUM CHLORIDE: 9 INJECTION, SOLUTION INTRAVENOUS at 18:10

## 2020-08-15 RX ADMIN — ONDANSETRON 4 MG: 2 INJECTION INTRAMUSCULAR; INTRAVENOUS at 16:13

## 2020-08-15 RX ADMIN — ATORVASTATIN CALCIUM 80 MG: 80 TABLET, FILM COATED ORAL at 20:46

## 2020-08-15 RX ADMIN — Medication 10 ML: at 20:48

## 2020-08-15 RX ADMIN — HEPARIN SODIUM 4000 UNITS: 1000 INJECTION INTRAVENOUS; SUBCUTANEOUS at 16:09

## 2020-08-15 RX ADMIN — POTASSIUM CHLORIDE 40 MEQ: 20 TABLET, EXTENDED RELEASE ORAL at 20:45

## 2020-08-15 RX ADMIN — FAMOTIDINE 20 MG: 20 TABLET, FILM COATED ORAL at 20:46

## 2020-08-15 RX ADMIN — Medication 0.4 MG: at 16:17

## 2020-08-15 RX ADMIN — Medication 0.4 MG: at 16:02

## 2020-08-15 RX ADMIN — Medication 0.4 MG: at 16:09

## 2020-08-15 ASSESSMENT — PAIN DESCRIPTION - LOCATION: LOCATION: CHEST

## 2020-08-15 ASSESSMENT — ENCOUNTER SYMPTOMS
COLOR CHANGE: 0
EYE REDNESS: 0
DIARRHEA: 0
CONSTIPATION: 0
VOMITING: 0
EYE DISCHARGE: 0
FACIAL SWELLING: 0
SHORTNESS OF BREATH: 0
ABDOMINAL PAIN: 0
COUGH: 0

## 2020-08-15 ASSESSMENT — PAIN SCALES - GENERAL
PAINLEVEL_OUTOF10: 5
PAINLEVEL_OUTOF10: 6
PAINLEVEL_OUTOF10: 5
PAINLEVEL_OUTOF10: 0

## 2020-08-15 ASSESSMENT — PAIN DESCRIPTION - PAIN TYPE: TYPE: ACUTE PAIN

## 2020-08-15 ASSESSMENT — PAIN DESCRIPTION - DESCRIPTORS: DESCRIPTORS: PRESSURE

## 2020-08-15 NOTE — ED NOTES
Pt back to room for c/o chest pain and shortness of breath x 30 min to 1 hour PTA. Pt states he has no cardiac history. Pt states the pain is mid-sternal and feels like pressure and that his hands feel as if they're going numb. Pt states he is having a hard time breathing as well. Pt is a & o x 4 and is able to stand and pivot from wheelchair to cot. Pt is showing STEMI on the EKG and HR is tachy. Pt's RR are tachy but unlabored with no use of accessory muscles. Pt's skin is warm, diaphoretic and otherwise appropriate for ethnicity.          Will Srivastava RN  08/15/20 4938

## 2020-08-15 NOTE — ED PROVIDER NOTES
[address] [place] [address] ED  EMERGENCY DEPARTMENT ENCOUNTER      Pt Name: Lex Villafana  MRN: [id number]  [place] 1965  Date of evaluation: 8/15/2020  Provider: Rk Cash MD    [address] [place] [address]       Chief Complaint   Patient presents with    Chest Pain     x 1 hour PTA    Shortness of Breath         HISTORY OF PRESENT ILLNESS  (Location/Symptom, Timing/Onset, Context/Setting, Quality, Duration, Modifying Factors, Severity.)   Lex Villafana is a 54 y.o. male who presents to the emergency department for chest pain. It started 1 hour ago and he feels a little bit short of breath. He has had near continuous pressure in his chest that he rated as a 5 upon arrival.  He is never had pain like this before but he feels very nervous. No history of heart disease. He has not had a fever or cough. He feels a little bit short of breath now but just since the pain started. No injury or back pain. Nursing Notes were reviewed. ALLERGIES     Patient has no known allergies. CURRENT MEDICATIONS       Previous Medications    CHOLECALCIFEROL (VITAMIN D PO)    Take 4,000 Units by mouth daily    CYANOCOBALAMIN (VITAMIN B12 PO)    Take 1 tablet by mouth daily    KRILL OIL PO    Take 1 capsule by mouth daily    OMEGA-3 FATTY ACIDS (OMEGA 3 PO)    Take 2 capsules by mouth daily       PAST MEDICAL HISTORY         Diagnosis Date    Prostate CA [facility]) 04/2018    PSA elevation 03/2018    RBBB     Saw cardiologist at [facility], \"normal\" for his age (per patient).     Snores     Wears glasses        SURGICAL HISTORY           Procedure Laterality Date    KNEE ARTHROSCOPY Right 1991    KNEE SURGERY Right     tendon reconstruction    CT LAP,PROSTATECTOMY,RADICAL,W/NERVE SPARE,INCL ROBOTIC N/A 5/23/2018    XI ROBOTIC LAPAROSCOPIC PROSTATECTOMY WITH PELVIC LYMPHNODE DISSECTION performed by [facility] Green [facility] at [facility]  03/2018    PROSTATECTOMY  05/23/2018    ROBOTIC ASSISTED  WITH PELVIC LYMPH NODE DISECTION. FAMILY HISTORY           Problem Relation Age of Onset    No Known Problems Father     No Known Problems Maternal Grandmother     No Known Problems Maternal Grandfather     No Known Problems Paternal Grandmother     No Known Problems Paternal Grandfather      Family Status   Relation Name Status    Mother  Alive    Father  (Not Specified)   Xavier Condon Brother  Alive    MGM  (Not Specified)    MGF  (Not Specified)    PGM  (Not Specified)    PGF  (Not Specified)    Derrick  Alive        SOCIAL HISTORY      reports that he quit smoking about 8 years ago. His smoking use included cigarettes. He started smoking about 43 years ago. He smoked 0.50 packs per day. He has never used smokeless tobacco. He reports that he does not drink alcohol or use drugs. REVIEW OF SYSTEMS    (2-9 systems for level 4, 10 or more for level 5)     Review of Systems   Constitutional: Negative for chills, fatigue and fever. HENT: Negative for congestion, ear discharge and facial swelling. Eyes: Negative for discharge and redness. Respiratory: Negative for cough and shortness of breath. Cardiovascular: Negative for chest pain. Gastrointestinal: Negative for abdominal pain, constipation, diarrhea and vomiting. Genitourinary: Negative for dysuria and hematuria. Musculoskeletal: Negative for arthralgias. Skin: Negative for color change and rash. Neurological: Negative for syncope, numbness and headaches. Hematological: Negative for adenopathy. Psychiatric/Behavioral: Negative for confusion. The patient is not nervous/anxious. Except as noted above the remainder of the review of systems was reviewed and negative.      PHYSICAL EXAM    (up to 7 for level 4, 8 or more for level 5)     Vitals:    08/15/20 1558   BP: (!) 174/88   Pulse: 116   Resp: 20   Temp: 98.2 °F (36.8 °C)   TempSrc: Oral   SpO2: 100%   Weight: 180 lb (81.6 kg)   Height: 5' 8\" (1.727 m)       Physical Exam  Constitutional: General: He is not in acute distress. Appearance: He is well-developed. He is diaphoretic. HENT:      Head: Normocephalic and atraumatic. Eyes:      General: No scleral icterus. Right eye: No discharge. Left eye: No discharge. Neck:      Musculoskeletal: Neck supple. Cardiovascular:      Rate and Rhythm: Normal rate and regular rhythm. Pulmonary:      Effort: Pulmonary effort is normal. No respiratory distress. Breath sounds: Normal breath sounds. No stridor. No wheezing or rales. Abdominal:      General: There is no distension. Palpations: Abdomen is soft. Tenderness: There is no abdominal tenderness. Musculoskeletal: Normal range of motion. Lymphadenopathy:      Cervical: No cervical adenopathy. Skin:     General: Skin is warm. Findings: No erythema or rash. Neurological:      Mental Status: He is alert and oriented to person, place, and time. Psychiatric:         Behavior: Behavior normal.      Comments: He appears mildly anxious. DIAGNOSTIC RESULTS     EKG: All EKG's are interpreted by the Emergency Department Physician who either signs or Co-signs this chart in the absence of a cardiologist.    EKG on my interpretation shows ST elevation in leads V3 through V6.    RADIOLOGY:   Non-plain film images such as CT, Ultrasound and MRI are read by the radiologist. Plain radiographic images are visualized and preliminarily interpreted by the emergency physician with the below findings:    Chest x-ray my interpretation shows no acute finding    Interpretation per the Radiologist below, if available at the time of this note:        LABS:  Labs Reviewed   BASIC METABOLIC PANEL   CBC WITH AUTO DIFFERENTIAL   TROP/MYOGLOBIN   TROP/MYOGLOBIN   APTT   PROTIME-INR       All other labs were within normal range or not returned as of this dictation.     EMERGENCY DEPARTMENT COURSE and DIFFERENTIAL DIAGNOSIS/MDM:   Vitals:    Vitals:    08/15/20 1558   BP: (!) 174/88   Pulse: 116   Resp: 20   Temp: 98.2 °F (36.8 °C)   TempSrc: Oral   SpO2: 100%   Weight: 180 lb (81.6 kg)   Height: 5' 8\" (1.727 m)       Orders Placed This Encounter   Medications    nitroGLYCERIN (NITROSTAT) SL tablet 0.4 mg    aspirin chewable tablet 324 mg    heparin (porcine) injection 4,000 Units       Medical Decision Making: My clinical impression is that he has acute ST segment elevation myocardial infarction. I have spoken to cardiologist who is planning on taking him to the Cath Lab. This was explained thoroughly to the patient. He was given IV heparin sublingual nitroglycerin and oral aspirin. The findings are discussed thoroughly with this patient and his wife was contacted by nursing staff as well. He remains hemodynamically stable. CRITICAL CARE TIME     Due to the high probability of sudden and clinically significant deterioration in the patient's condition he required highest level of my preparedness to intervene urgently. I provided critical care time including documentation time, medication orders and management, reevaluation, vital sign assessment, ordering and reviewing of of lab tests ordering and reviewing of x-ray studies, and admission orders. Aggregate critical care time is 40 minutes including only time during which I was engaged in work directly related to his care and did not include time spent treating other patients simultaneously. CONSULTS:  None    PROCEDURES:  None    FINAL IMPRESSION      1. Acute ST elevation myocardial infarction (STEMI), unspecified artery [facility])          DISPOSITION/PLAN   DISPOSITION Decision To Admit 08/15/2020 04:06:49 PM      PATIENT REFERRED TO:   No follow-up provider specified.     DISCHARGE MEDICATIONS:     New Prescriptions    No medications on file         (Please note that portions of this note were completed with a voice recognition program.  Efforts were made to edit the dictations but occasionally words are mis-transcribed.)    Xavier Quezada MD  Attending Emergency Physician            Xavier Quezada MD  08/15/20 0121

## 2020-08-15 NOTE — PROGRESS NOTES
Patient arrived to unit from cath lab at this time. Per Report:   Patient underwent heart cath with Dr. Latrice Ellison  Access via Right femoral, size 6sheath remains in place. 3  Stent to the LAD, 1 stent  to the Dig deployed in cath lab. Pt received 2of Versed & 25 of Fentanyl, andiomax gtt stopped 1800, and Brillnta 180 mg. No complications noted to site at this time and distal pulses remain palpable. Pt denies all pain and complaints at this time. See flowsheets for further info. Writer will continue to monitor.

## 2020-08-15 NOTE — CONSULTS
[facility] Cardiology Cardiology    Consult                        Today's Date: 8/15/2020  Patient Name: Racquel Thomas  Date of admission: 8/15/2020  3:53 PM  Patient's age: 54 y.o., 1965  Admission Dx: STEMI (ST elevation myocardial infarction) ([facility] [address] [I21.3]    Reason for Consult:  Cardiac evaluation    Requesting Physician: No admitting provider for patient encounter. CHIEF COMPLAINT:  Chest pain    History Obtained From:  patient    HISTORY OF PRESENT ILLNESS:      The patient is a 54 y.o.  male who presented with chest pain that started one hr before associated with dyspnea. On arrival ECG showed anterior STEMI. Cath lab activated. Past Medical History:   has a past medical history of Prostate CA ([facility] [address] PSA elevation, RBBB, Snores, and Wears glasses. Past Surgical History:   has a past surgical history that includes Knee arthroscopy (Right, 1991); Prostate Biopsy (03/2018); knee surgery (Right); Prostatectomy (05/23/2018); and pr lap,prostatectomy,radical,w/nerve spare,incl robotic (N/A, 5/23/2018). Home Medications:    Prior to Admission medications    Medication Sig Start Date End Date Taking? Authorizing Provider   Multiple Vitamin (MULTI-VITAMIN DAILY) TABS Take 1 tablet by mouth daily   Yes Historical Provider, MD   Cyanocobalamin (VITAMIN B12 PO) Take 1 tablet by mouth daily    Historical Provider, MD   KRILL OIL PO Take 1 capsule by mouth daily    Historical Provider, MD   Cholecalciferol (VITAMIN D PO) Take 4,000 Units by mouth daily    Historical Provider, MD   Omega-3 Fatty Acids (OMEGA 3 PO) Take 2 capsules by mouth daily    Historical Provider, MD       Allergies:  Patient has no known allergies. Social History:   reports that he quit smoking about 8 years ago. His smoking use included cigarettes. He started smoking about 43 years ago. He smoked 0.50 packs per day. He has never used smokeless tobacco. He reports that he does not drink alcohol or use drugs.      Family History: family history includes No Known Problems in his father, maternal grandfather, maternal grandmother, paternal grandfather, and paternal grandmother. No h/o sudden cardiac death. No for premature CAD    REVIEW OF SYSTEMS:    · Constitutional: there has been no unanticipated weight loss. There's been No change in energy level, No change in activity level. · Eyes: No visual changes or diplopia. No scleral icterus. · ENT: No Headaches, hearing loss or vertigo. No mouth sores or sore throat. · Cardiovascular: see above  · Respiratory: see above  · Gastrointestinal: No abdominal pain, appetite loss, blood in stools. · Genitourinary: No dysuria, trouble voiding, or hematuria. · Musculoskeletal:  No gait disturbance, No weakness or joint complaints. · Integumentary: No rash or pruritis. · Neurological: No headache or diplopia. No tingling  · Psychiatric: No anxiety, or depression. · Endocrine: No temperature intolerance. · Hematologic/Lymphatic: No abnormal bruising or bleeding, blood clots or swollen lymph nodes. · Allergic/Immunologic: No nasal congestion or hives. PHYSICAL EXAM:      BP (!) 174/88   Pulse 116   Temp 98.2 °F (36.8 °C) (Oral)   Resp 20   Ht 5' 8\" (1.727 m)   Wt 180 lb (81.6 kg)   SpO2 100%   BMI 27.37 kg/m²    Constitutional and General Appearance: alert, cooperative, no distress and appears stated age  HEENT: PERRL, no cervical lymphadenopathy. No masses palpable. Normal oral mucosa  Respiratory:  · Normal excursion and expansion without use of accessory muscles  · Resp Auscultation: Good respiratory effort. No for increased work of breathing.  On auscultation: clear to auscultation bilaterally  Cardiovascular:  · Heart tones are crisp and normal. regular S1 and S2.  · Jugular venous pulsation Normal  · The carotid upstroke is normal in amplitude and contour without delay or bruit   Abdomen:   · soft  · Bowel sounds present  Extremities:  ·  No edema  Neurological:  · Alert and oriented. DATA:    Diagnostics:    EKG: Anterior STEMI  Labs:     CBC:   Recent Labs     08/15/20  1600   WBC 17.8*   HGB 15.0   HCT 43.9        BMP:   Recent Labs     08/15/20  1600      K 3.2*   CO2 18*   BUN 14   CREATININE 0.88   LABGLOM >60   GLUCOSE 175*     BNP: No results for input(s): BNP in the last 72 hours. PT/INR:   Recent Labs     08/15/20  1600   PROTIME 13.1   INR 1.0     APTT:  Recent Labs     08/15/20  1600   APTT 44.6*     CARDIAC ENZYMES:No results for input(s): CKTOTAL, CKMB, CKMBINDEX, TROPONINI in the last 72 hours. FASTING LIPID PANEL:No results found for: HDL, LDLDIRECT, LDLCALC, TRIG  LIVER PROFILE:No results for input(s): AST, ALT, LABALBU in the last 72 hours. IMPRESSION:    Patient Active Problem List   Diagnosis    Prostate cancer ([facility] [address]    STEMI (ST elevation myocardial infarction) ([facility] [address]     Anterior STEMI  H/o smoking now vaping    RECOMMENDATIONS:  1. Proceed with emergent cath      Discussed with patient and Nurse.     Gareth Jules [address] Cardiology Consult           [phone]

## 2020-08-15 NOTE — PROGRESS NOTES
Transitions of Care Pharmacy Service   Medication Review    The patient's list of current home medications has been reviewed and updated. Source(s) of information: Patient, ED RN    Interviewed patient during STEMI so a full interview was not conducted. Does not take any prescription medications. Dose take some OTC vitamins, minerals and herbals. Did not confirm these with patient. Please feel free to call with any questions about this encounter. Thank you. Roula Smith, [address] [facility]  Transitions of Care Pharmacy Service  Phone:  [phone]  Fax: [phone]      Prior to Admission medications    Medication Sig Start Date End Date Taking?  Authorizing Provider   Multiple Vitamin (MULTI-VITAMIN DAILY) TABS Take 1 tablet by mouth daily   Yes Historical Provider, MD   Cyanocobalamin (VITAMIN B12 PO) Take 1 tablet by mouth daily    Historical Provider, MD   KRILL OIL PO Take 1 capsule by mouth daily    Historical Provider, MD   Cholecalciferol (VITAMIN D PO) Take 4,000 Units by mouth daily    Historical Provider, MD   Omega-3 Fatty Acids (OMEGA 3 PO) Take 2 capsules by mouth daily    Historical Provider, MD

## 2020-08-16 LAB
ABSOLUTE EOS #: 0.07 K/UL (ref 0–0.44)
ABSOLUTE IMMATURE GRANULOCYTE: 0.05 K/UL (ref 0–0.3)
ABSOLUTE LYMPH #: 2.33 K/UL (ref 1.1–3.7)
ABSOLUTE MONO #: 0.94 K/UL (ref 0.1–1.2)
ANION GAP SERPL CALCULATED.3IONS-SCNC: 12 MMOL/L (ref 9–17)
BASOPHILS # BLD: 0 % (ref 0–2)
BASOPHILS ABSOLUTE: 0.03 K/UL (ref 0–0.2)
BUN BLDV-MCNC: 12 MG/DL (ref 6–20)
BUN/CREAT BLD: 18 (ref 9–20)
CALCIUM SERPL-MCNC: 9 MG/DL (ref 8.6–10.4)
CHLORIDE BLD-SCNC: 106 MMOL/L (ref 98–107)
CO2: 25 MMOL/L (ref 20–31)
CREAT SERPL-MCNC: 0.65 MG/DL (ref 0.7–1.2)
DIFFERENTIAL TYPE: ABNORMAL
EOSINOPHILS RELATIVE PERCENT: 1 % (ref 1–4)
GFR AFRICAN AMERICAN: >60 ML/MIN
GFR NON-AFRICAN AMERICAN: >60 ML/MIN
GFR SERPL CREATININE-BSD FRML MDRD: ABNORMAL ML/MIN/{1.73_M2}
GFR SERPL CREATININE-BSD FRML MDRD: ABNORMAL ML/MIN/{1.73_M2}
GLUCOSE BLD-MCNC: 94 MG/DL (ref 70–99)
HCT VFR BLD CALC: 38.7 % (ref 40.7–50.3)
HEMOGLOBIN: 12.7 G/DL (ref 13–17)
IMMATURE GRANULOCYTES: 0 %
INR BLD: 1.2
LYMPHOCYTES # BLD: 20 % (ref 24–43)
MCH RBC QN AUTO: 29.7 PG (ref 25.2–33.5)
MCHC RBC AUTO-ENTMCNC: 32.8 G/DL (ref 28.4–34.8)
MCV RBC AUTO: 90.6 FL (ref 82.6–102.9)
MONOCYTES # BLD: 8 % (ref 3–12)
NRBC AUTOMATED: 0 PER 100 WBC
PDW BLD-RTO: 12.7 % (ref 11.8–14.4)
PLATELET # BLD: 238 K/UL (ref 138–453)
PLATELET ESTIMATE: ABNORMAL
PMV BLD AUTO: 10 FL (ref 8.1–13.5)
POTASSIUM SERPL-SCNC: 3.6 MMOL/L (ref 3.7–5.3)
PROTHROMBIN TIME: 14.9 SEC (ref 11.5–14.2)
RBC # BLD: 4.27 M/UL (ref 4.21–5.77)
RBC # BLD: ABNORMAL 10*6/UL
SEG NEUTROPHILS: 71 % (ref 36–65)
SEGMENTED NEUTROPHILS ABSOLUTE COUNT: 8.39 K/UL (ref 1.5–8.1)
SODIUM BLD-SCNC: 143 MMOL/L (ref 135–144)
TROPONIN INTERP: ABNORMAL
TROPONIN T: ABNORMAL NG/ML
TROPONIN, HIGH SENSITIVITY: 496 NG/L (ref 0–22)
TROPONIN, HIGH SENSITIVITY: 853 NG/L (ref 0–22)
TROPONIN, HIGH SENSITIVITY: 947 NG/L (ref 0–22)
WBC # BLD: 11.8 K/UL (ref 3.5–11.3)
WBC # BLD: ABNORMAL 10*3/UL

## 2020-08-16 PROCEDURE — 85025 COMPLETE CBC W/AUTO DIFF WBC: CPT

## 2020-08-16 PROCEDURE — 2580000003 HC RX 258: Performed by: FAMILY MEDICINE

## 2020-08-16 PROCEDURE — 80048 BASIC METABOLIC PNL TOTAL CA: CPT

## 2020-08-16 PROCEDURE — 2580000003 HC RX 258: Performed by: INTERNAL MEDICINE

## 2020-08-16 PROCEDURE — 6370000000 HC RX 637 (ALT 250 FOR IP): Performed by: INTERNAL MEDICINE

## 2020-08-16 PROCEDURE — 6370000000 HC RX 637 (ALT 250 FOR IP): Performed by: FAMILY MEDICINE

## 2020-08-16 PROCEDURE — 1200000000 HC SEMI PRIVATE

## 2020-08-16 PROCEDURE — 84484 ASSAY OF TROPONIN QUANT: CPT

## 2020-08-16 PROCEDURE — 36415 COLL VENOUS BLD VENIPUNCTURE: CPT

## 2020-08-16 PROCEDURE — 85610 PROTHROMBIN TIME: CPT

## 2020-08-16 RX ORDER — CARVEDILOL 6.25 MG/1
6.25 TABLET ORAL 2 TIMES DAILY WITH MEALS
Status: DISCONTINUED | OUTPATIENT
Start: 2020-08-16 | End: 2020-08-17

## 2020-08-16 RX ORDER — SPIRONOLACTONE 25 MG/1
25 TABLET ORAL DAILY
Status: DISCONTINUED | OUTPATIENT
Start: 2020-08-16 | End: 2020-08-17 | Stop reason: HOSPADM

## 2020-08-16 RX ADMIN — FAMOTIDINE 20 MG: 20 TABLET, FILM COATED ORAL at 08:42

## 2020-08-16 RX ADMIN — Medication 10 ML: at 08:43

## 2020-08-16 RX ADMIN — SPIRONOLACTONE 25 MG: 25 TABLET ORAL at 14:03

## 2020-08-16 RX ADMIN — Medication 10 ML: at 20:56

## 2020-08-16 RX ADMIN — TICAGRELOR 90 MG: 90 TABLET ORAL at 20:55

## 2020-08-16 RX ADMIN — TICAGRELOR 90 MG: 90 TABLET ORAL at 08:42

## 2020-08-16 RX ADMIN — ASPIRIN 81 MG: 81 TABLET, COATED ORAL at 08:42

## 2020-08-16 RX ADMIN — CARVEDILOL 6.25 MG: 6.25 TABLET, FILM COATED ORAL at 18:14

## 2020-08-16 RX ADMIN — ATORVASTATIN CALCIUM 80 MG: 80 TABLET, FILM COATED ORAL at 20:55

## 2020-08-16 RX ADMIN — CARVEDILOL 3.12 MG: 3.12 TABLET, FILM COATED ORAL at 08:42

## 2020-08-16 RX ADMIN — SODIUM CHLORIDE: 9 INJECTION, SOLUTION INTRAVENOUS at 01:44

## 2020-08-16 RX ADMIN — FAMOTIDINE 20 MG: 20 TABLET, FILM COATED ORAL at 20:55

## 2020-08-16 RX ADMIN — LISINOPRIL 2.5 MG: 2.5 TABLET ORAL at 08:42

## 2020-08-16 ASSESSMENT — PAIN SCALES - GENERAL: PAINLEVEL_OUTOF10: 0

## 2020-08-16 NOTE — PROGRESS NOTES
Occupational Therapy   [facility]  Occupational Therapy Not Seen Note    Patient not available for Occupational Therapy due to:    [] Testing:    [] Hemodialysis    [] Cancelled by RN:    []Refusal by Patient:    [] Surgery:     [] Intubation:     [] Pain Medication:    [] Sedation:     [] Spine Precautions :    [] Medical Instability:    [x] Other:(Per [facility], pt is fully independent)            Luiz Dorantes OTR/L

## 2020-08-16 NOTE — CARE COORDINATION
Case Management Initial Discharge Plan  [facility],         Readmission Risk              Risk of Unplanned Readmission:        11             Met with:patient to discuss discharge plans. Information verified: address, contacts, phone number, , insurance Yes  PCP: Prosper Sanchez DO  Date of last visit: few months     Insurance Provider: [address]     Discharge Planning  Current Residence:  [address]  Living Arrangements:  [address] [facility] [place] is an apt on [address]. His face sheet address is his mailing address only. He has few steps to enter apt. Support Systems:  Family Members       Current Services PTA:  None  Agency: none       Patient able to perform ADL's:Independent  DME in home:  None   DME used to aid ambulation prior to admission:   None   DME used during admission:  None     Potential Assistance Needed:  N/A    Pharmacy: [facility] in [place] [address] Medications:  No  Does patient want to participate in local refill/ meds to beds program?  No    Patient agreeable to home care: No  [place] of choice provided:  no      Type of Home Care Services:  None  Patient expects to be discharged to:  HOME    Prior SNF/Rehab Placement and Facility: none   Agreeable to SNF/Rehab: No  [place] of choice provided: n/a   Evaluation: n/a    Expected Discharge date:  20  Follow Up Appointment: Best Day/ Time: Friday PM    Transportation provider: per family  Transportation arrangements needed for discharge: No    Discharge Plan:   Patient lives at home alone. He is s/p cardiac cath with 4 total stents. He will be sent home on brilinta. Coupon for free month and reduces rate given to him. He has [employer] [address]     Patient will need cardiac rehab and locations discussed. He would like to be referred to rehab in [place]. Cardiac rehab referral for [facility] made and added to his discharge instructions. No other needs.      Electronically signed by José Delcid RN on 8/16/20 at 12:12 PM EDT

## 2020-08-16 NOTE — H&P
History & Physical  [facility].,    Adult Hospitalist      Name: Giulia Delgado  MRN: [id number]     Acct: [de-identified]  Room: 2030/2030-01    Admit Date: 8/15/2020  3:53 PM  PCP: Ro Rowell, DO    Primary Problem  Active Problems:    STEMI (ST elevation myocardial infarction) [facility])  Resolved Problems:    * No resolved hospital problems. *        Assesment:     · STEMI  · Coronary artery disease status post 4 stents on 8/15/2020  · Ischemic cardiomyopathy  · Hypertension  · Hyperlipidemia  · Former smoker      Plan:     · Patient to CICU  · Also give aspirin more than 90%  · Telemetry  · Check consent closely  · CBC BMP daily  · Patient is status post cardiac catheterization and stenting  · Continue aspirin and Brilinta  · Continue carvedilol  · Continue lisinopril and Aldactone  · Continue atorvastatin  · Quit smoking  · Cardiology on board  · Transfer patient out of ICU  · DVT and GI prophylaxis. Chief Complaint:     Chief Complaint   Patient presents with    Chest Pain     x 1 hour PTA    Shortness of Breath         History of Present Illness:      Giulia Delgado is a 54 y.o.  male who presents with Chest Pain (x 1 hour PTA) and Shortness of Breath  This is a 63-year-old gentleman who is been admitted via emergency room, patient came to the ER with a complaint of having chest pain while getting haircut, patient tells me as he started having continuous chest pressure, he drove himself to the emergency room, further in the emergency room patient was noticed to have STEMI, cardiology was consulted and was taken to Cath Lab emergently patient tells me that he has history of smoking    I have personally reviewed the past medical history, past surgical history, medications, social history, and family history, and summarized in the note. Review of Systems:     All 10 point system is reviewed and negative otherwise mentioned in HPI.       Past Medical History:     Past Medical History:   Diagnosis Date  Prostate CA [facility]) 04/2018    PSA elevation 03/2018    RBBB     Saw cardiologist at [facility], \"normal\" for his age (per patient).  Snores     Wears glasses         Past Surgical History:     Past Surgical History:   Procedure Laterality Date    KNEE ARTHROSCOPY Right 1991    KNEE SURGERY Right     tendon reconstruction    NM LAP,PROSTATECTOMY,RADICAL,W/NERVE SPARE,INCL ROBOTIC N/A 5/23/2018    XI ROBOTIC LAPAROSCOPIC PROSTATECTOMY WITH PELVIC LYMPHNODE DISSECTION performed by Coretta Austin MD at [address] [place] [address]  03/2018    PROSTATECTOMY  05/23/2018    ROBOTIC ASSISTED  WITH PELVIC LYMPH NODE DISECTION. Medications Prior to Admission:       Prior to Admission medications    Medication Sig Start Date End Date Taking? Authorizing Provider   Multiple Vitamin (MULTI-VITAMIN DAILY) TABS Take 1 tablet by mouth daily   Yes Historical Provider, MD   Cyanocobalamin (VITAMIN B12 PO) Take 1 tablet by mouth daily    Historical Provider, MD   KRILL OIL PO Take 1 capsule by mouth daily    Historical Provider, MD   Cholecalciferol (VITAMIN D PO) Take 4,000 Units by mouth daily    Historical Provider, MD   Omega-3 Fatty Acids (OMEGA 3 PO) Take 2 capsules by mouth daily    Historical Provider, MD        Allergies:       Patient has no known allergies. Social History:     Tobacco:    reports that he quit smoking about 8 years ago. His smoking use included cigarettes. He started smoking about 43 years ago. He smoked 0.50 packs per day. He has never used smokeless tobacco.  Alcohol:      reports no history of alcohol use. Drug Use:  reports no history of drug use.     Family History:     Family History   Problem Relation Age of Onset    No Known Problems Father     No Known Problems Maternal Grandmother     No Known Problems Maternal Grandfather     No Known Problems Paternal Grandmother     No Known Problems Paternal Grandfather          Physical Exam:     Vitals:  BP (!) 148/135   Pulse 80 Temp 97.5 °F (36.4 °C) (Temporal)   Resp 16   Ht 5' 8\" (1.727 m)   Wt 180 lb (81.6 kg)   SpO2 95%   BMI 27.37 kg/m²   Temp (24hrs), Av.7 °F (36.5 °C), Min:96 °F (35.6 °C), Max:98.9 °F (37.2 °C)          General appearance - alert, well appearing, and in no acute distress  Mental status - oriented to person, place, and time with normal affect  Head - normocephalic and atraumatic  Eyes - pupils equal and reactive, extraocular eye movements intact, conjunctiva clear  Ears - hearing appears to be intact  Nose - no drainage noted  Mouth - mucous membranes moist  Neck - supple, no carotid bruits, thyroid not palpable  Chest - clear to auscultation, normal effort  Heart - normal rate, regular rhythm, no murmur  Abdomen - soft, nontender, nondistended, bowel sounds present all four quadrants, no masses, hepatomegaly or splenomegaly  Neurological - normal speech, no focal findings or movement disorder noted, cranial nerves II through XII grossly intact  Extremities - peripheral pulses palpable, no pedal edema or calf pain with palpation  Skin - no gross lesions, rashes, or induration noted        Data:     Labs:    Hematology:  Recent Labs     08/15/20  1600 08/16/20  0449   WBC 17.8* 11.8*   RBC 4.98 4.27   HGB 15.0 12.7*   HCT 43.9 38.7*   MCV 88.2 90.6   MCH 30.1 29.7   MCHC 34.2 32.8   RDW 12.5 12.7    238   MPV 10.2 10.0   INR 1.0 1.2     Chemistry:  Recent Labs     08/15/20  1600 08/15/20  1840 20  0036 20  0449     --   --  143   K 3.2*  --   --  3.6*     --   --  106   CO2 18*  --   --  25   GLUCOSE 175*  --   --  94   BUN 14  --   --  12   CREATININE 0.88  --   --  0.65*   ANIONGAP 22*  --   --  12   LABGLOM >60  --   --  >60   GFRAA >60  --   --  >60   CALCIUM 10.0  --   --  9.0   TROPHS 45* 501* 947* 853*   MYOGLOBIN 75* 148*  --   --      Recent Labs     08/15/20  1840   CHOL 124   HDL 43   LDLCHOLESTEROL 68   CHOLHDLRATIO 2.9   TRIG 63   VLDL NOT REPORTED       Lab Results   Component Value Date    INR 1.2 08/16/2020    INR 1.0 08/15/2020    PROTIME 14.9 (H) 08/16/2020    PROTIME 13.1 08/15/2020       Lab Results   Component Value Date/Time    SPECIAL NOT REPORTED 05/23/2018 10:52 AM     Lab Results   Component Value Date/Time    CULTURE NO GROWTH 05/23/2018 10:52 AM    CULTURE  05/23/2018 10:52 AM     [facility] [id number] [place] [address], [address] [place] [address] [phone]       No results found for: POCPH, PHART, PH, POCPCO2, DCZ6YUB, PCO2, POCPO2, PO2ART, PO2, POCHCO3, XIS5PJN, HCO3, NBEA, PBEA, BEART, BE, THGBART, THB, IQA7UIM, SLBJ5IPR, F5ALJQZQ, O2SAT, FIO2    Radiology:    Xr Chest Portable    Result Date: 8/15/2020  Mild vascular congestion. No focal consolidation is seen to suggest pneumonia. All radiological studies reviewed                Code Status:  Full Code    Electronically signed by Shari Barron MD on 8/16/2020 at 5:15 PM     Copy sent to Dr. Laurita Gonzalez DO    This note was created with the assistance of a speech-recognition program.  Although the intention is to generate a document that actually reflects the content of the visit, no guarantees can be provided that every mistake has been identified and corrected by editing. Note was updated later by me after  physical examination and  completion of the assessment.

## 2020-08-16 NOTE — PROGRESS NOTES
Physical Therapy  DATE: 2020    NAME: Ezzard Schaumann  MRN: [id number]   : 1965    Patient not seen this date for Physical Therapy due to:  [] Blood transfusion in progress  [] Cancel by RN  [] Hemodialysis  []  Refusal by Patient   [] Spine Precautions   [] Strict Bedrest  [] Surgery  [] Testing      [x] Other (Per [facility], pt is fully independent)        [] PT being discontinued at this time. Patient independent. No further needs. [] PT being discontinued at this time as the patient has been transferred to hospice care. No further needs.     Ej Montejo, PT  08:47

## 2020-08-16 NOTE — PROGRESS NOTES
[facility]   Progress Note                   Date:   8/16/2020  Patient name: Misael Agarwal  Date of admission:  8/15/2020  3:53 PM  MRN:   [id number]  YOB: 1965  PCP: Isadora Morales DO    Reason for Admission: [facility] [I21.3]    Subjective:       Clinical Changes / Abnormalities: doing well. No chest pain or dyspnea. Medications:   Scheduled Meds:   sodium chloride flush  10 mL Intravenous 2 times per day    famotidine  20 mg Oral BID    atorvastatin  80 mg Oral Nightly    carvedilol  3.125 mg Oral BID WC    lisinopril  2.5 mg Oral Daily    aspirin  81 mg Oral Daily    ticagrelor  90 mg Oral BID    nicotine  1 patch Transdermal Daily     Continuous Infusions:   sodium chloride 75 mL/hr at 08/16/20 0144     CBC:   Recent Labs     08/15/20  1600 08/16/20  0449   WBC 17.8* 11.8*   HGB 15.0 12.7*    238     BMP:    Recent Labs     08/15/20  1600 08/16/20  0449    143   K 3.2* 3.6*    106   CO2 18* 25   BUN 14 12   CREATININE 0.88 0.65*   GLUCOSE 175* 94     Hepatic: No results for input(s): AST, ALT, ALB, BILITOT, ALKPHOS in the last 72 hours. Troponin: No results for input(s): TROPONINI in the last 72 hours. BNP: No results for input(s): BNP in the last 72 hours. Lipids:   Recent Labs     08/15/20  1840   CHOL 124   HDL 43     INR:   Recent Labs     08/15/20  1600 08/16/20  0449   INR 1.0 1.2       Objective:   Vitals: BP (!) 144/74   Pulse 90   Temp 97.4 °F (36.3 °C) (Temporal)   Resp 16   Ht 5' 8\" (1.727 m)   Wt 180 lb (81.6 kg)   SpO2 99%   BMI 27.37 kg/m²   General appearance: alert and cooperative with exam  HEENT: Head: Normocephalic, no lesions, without obvious abnormality. Neck: no JVD  Lungs: CTAB  Heart: RRR s1+s2, no murmurs  Abdomen: soft, non-tender  Extremities: no edema  Neurologic: not done    Cardiac cath 8/15/2020  Procedure Summary      Anterior STEMI.    Moderate LV systolic dysfunctionl LVEF 35%. Successful PTCA/GINI of distal and mid LAD. Successful PTCA/GINI of mid D1. Recommendations      Medical therapy as needed. Risk factor modification. Routine Post Stent Orders. TTE.    If LVEF is < 35%, will need LifeVest.        Assessment / Acute Cardiac Problems:   Anterior STEMI  Ischemic cardiomyopathy  H/o smoking now vaping    Patient Active Problem List:     Pro[facility] cancer ([facility]     STEMI (ST elevation myocardial infarction) ([facility]      Plan of Treatment:   Continue ASA and brilinta  Continue statin  Replace k  Trend trop  TTE pending  Increase core to 6.25mg po BID  Continue ACE  Add aldactone 25mg po qday  Will need cardiac rehab  If LVEF < 35% on TTE, will need lifevest prior to discharge  Nutrition consult  CHF education      Gareth Dejesus [facility]  [phone]

## 2020-08-16 NOTE — PLAN OF CARE
Problem: Falls - Risk of:  Goal: Will remain free from falls  Description: Will remain free from falls  Outcome: Met This Shift  Pt ambulating with steady gait      Problem: Pain:  Goal: Pain level will decrease  Description: Pain level will decrease  Outcome: Met This Shift  Pt denied chest pain during shift

## 2020-08-17 LAB
ABSOLUTE EOS #: 0.23 K/UL (ref 0–0.44)
ABSOLUTE IMMATURE GRANULOCYTE: 0.07 K/UL (ref 0–0.3)
ABSOLUTE LYMPH #: 2.4 K/UL (ref 1.1–3.7)
ABSOLUTE MONO #: 1.09 K/UL (ref 0.1–1.2)
ANION GAP SERPL CALCULATED.3IONS-SCNC: 12 MMOL/L (ref 9–17)
BASOPHILS # BLD: 0 % (ref 0–2)
BASOPHILS ABSOLUTE: 0.04 K/UL (ref 0–0.2)
BUN BLDV-MCNC: 9 MG/DL (ref 6–20)
BUN/CREAT BLD: 14 (ref 9–20)
CALCIUM SERPL-MCNC: 9.4 MG/DL (ref 8.6–10.4)
CHLORIDE BLD-SCNC: 104 MMOL/L (ref 98–107)
CO2: 25 MMOL/L (ref 20–31)
CREAT SERPL-MCNC: 0.65 MG/DL (ref 0.7–1.2)
DIFFERENTIAL TYPE: ABNORMAL
EKG ATRIAL RATE: 74 BPM
EKG ATRIAL RATE: 96 BPM
EKG P AXIS: 68 DEGREES
EKG P AXIS: 78 DEGREES
EKG P-R INTERVAL: 126 MS
EKG P-R INTERVAL: 130 MS
EKG Q-T INTERVAL: 374 MS
EKG Q-T INTERVAL: 388 MS
EKG QRS DURATION: 116 MS
EKG QRS DURATION: 116 MS
EKG QTC CALCULATION (BAZETT): 415 MS
EKG QTC CALCULATION (BAZETT): 490 MS
EKG R AXIS: 104 DEGREES
EKG R AXIS: 85 DEGREES
EKG T AXIS: 29 DEGREES
EKG T AXIS: 54 DEGREES
EKG VENTRICULAR RATE: 74 BPM
EKG VENTRICULAR RATE: 96 BPM
EOSINOPHILS RELATIVE PERCENT: 2 % (ref 1–4)
GFR AFRICAN AMERICAN: >60 ML/MIN
GFR NON-AFRICAN AMERICAN: >60 ML/MIN
GFR SERPL CREATININE-BSD FRML MDRD: ABNORMAL ML/MIN/{1.73_M2}
GFR SERPL CREATININE-BSD FRML MDRD: ABNORMAL ML/MIN/{1.73_M2}
GLUCOSE BLD-MCNC: 105 MG/DL (ref 70–99)
HCT VFR BLD CALC: 40.9 % (ref 40.7–50.3)
HEMOGLOBIN: 13.4 G/DL (ref 13–17)
IMMATURE GRANULOCYTES: 1 %
LV EF: 40 %
LVEF MODALITY: NORMAL
LYMPHOCYTES # BLD: 20 % (ref 24–43)
MCH RBC QN AUTO: 29.9 PG (ref 25.2–33.5)
MCHC RBC AUTO-ENTMCNC: 32.8 G/DL (ref 28.4–34.8)
MCV RBC AUTO: 91.3 FL (ref 82.6–102.9)
MONOCYTES # BLD: 9 % (ref 3–12)
NRBC AUTOMATED: 0 PER 100 WBC
PDW BLD-RTO: 12.8 % (ref 11.8–14.4)
PLATELET # BLD: 220 K/UL (ref 138–453)
PLATELET ESTIMATE: ABNORMAL
PMV BLD AUTO: 9.9 FL (ref 8.1–13.5)
POTASSIUM SERPL-SCNC: 3.8 MMOL/L (ref 3.7–5.3)
RBC # BLD: 4.48 M/UL (ref 4.21–5.77)
RBC # BLD: ABNORMAL 10*6/UL
SEG NEUTROPHILS: 68 % (ref 36–65)
SEGMENTED NEUTROPHILS ABSOLUTE COUNT: 8.39 K/UL (ref 1.5–8.1)
SODIUM BLD-SCNC: 141 MMOL/L (ref 135–144)
WBC # BLD: 12.2 K/UL (ref 3.5–11.3)
WBC # BLD: ABNORMAL 10*3/UL

## 2020-08-17 PROCEDURE — C1725 CATH, TRANSLUMIN NON-LASER: HCPCS

## 2020-08-17 PROCEDURE — C1874 STENT, COATED/COV W/DEL SYS: HCPCS

## 2020-08-17 PROCEDURE — 80048 BASIC METABOLIC PNL TOTAL CA: CPT

## 2020-08-17 PROCEDURE — 6370000000 HC RX 637 (ALT 250 FOR IP): Performed by: INTERNAL MEDICINE

## 2020-08-17 PROCEDURE — C1769 GUIDE WIRE: HCPCS

## 2020-08-17 PROCEDURE — 2709999900 HC NON-CHARGEABLE SUPPLY

## 2020-08-17 PROCEDURE — 2580000003 HC RX 258: Performed by: FAMILY MEDICINE

## 2020-08-17 PROCEDURE — 2700000000 HC OXYGEN THERAPY PER DAY

## 2020-08-17 PROCEDURE — 93306 TTE W/DOPPLER COMPLETE: CPT

## 2020-08-17 PROCEDURE — C1894 INTRO/SHEATH, NON-LASER: HCPCS

## 2020-08-17 PROCEDURE — 85025 COMPLETE CBC W/AUTO DIFF WBC: CPT

## 2020-08-17 PROCEDURE — C1887 CATHETER, GUIDING: HCPCS

## 2020-08-17 PROCEDURE — 36415 COLL VENOUS BLD VENIPUNCTURE: CPT

## 2020-08-17 PROCEDURE — 6370000000 HC RX 637 (ALT 250 FOR IP): Performed by: FAMILY MEDICINE

## 2020-08-17 RX ORDER — LISINOPRIL 2.5 MG/1
2.5 TABLET ORAL DAILY
Qty: 30 TABLET | Refills: 3 | Status: SHIPPED | OUTPATIENT
Start: 2020-08-18

## 2020-08-17 RX ORDER — FAMOTIDINE 20 MG/1
20 TABLET, FILM COATED ORAL 2 TIMES DAILY
Qty: 60 TABLET | Refills: 3 | Status: SHIPPED | OUTPATIENT
Start: 2020-08-17

## 2020-08-17 RX ORDER — CARVEDILOL 12.5 MG/1
12.5 TABLET ORAL 2 TIMES DAILY WITH MEALS
Status: DISCONTINUED | OUTPATIENT
Start: 2020-08-17 | End: 2020-08-17 | Stop reason: HOSPADM

## 2020-08-17 RX ORDER — ATORVASTATIN CALCIUM 80 MG/1
80 TABLET, FILM COATED ORAL NIGHTLY
Qty: 30 TABLET | Refills: 3 | Status: SHIPPED | OUTPATIENT
Start: 2020-08-17

## 2020-08-17 RX ORDER — SPIRONOLACTONE 25 MG/1
25 TABLET ORAL DAILY
Qty: 30 TABLET | Refills: 3 | Status: SHIPPED | OUTPATIENT
Start: 2020-08-18

## 2020-08-17 RX ORDER — CARVEDILOL 12.5 MG/1
12.5 TABLET ORAL 2 TIMES DAILY WITH MEALS
Qty: 60 TABLET | Refills: 3 | Status: SHIPPED | OUTPATIENT
Start: 2020-08-17

## 2020-08-17 RX ORDER — ASPIRIN 81 MG/1
81 TABLET ORAL DAILY
Qty: 30 TABLET | Refills: 3 | Status: SHIPPED | OUTPATIENT
Start: 2020-08-18

## 2020-08-17 RX ADMIN — Medication 10 ML: at 08:11

## 2020-08-17 RX ADMIN — ASPIRIN 81 MG: 81 TABLET, COATED ORAL at 08:10

## 2020-08-17 RX ADMIN — TICAGRELOR 90 MG: 90 TABLET ORAL at 08:10

## 2020-08-17 RX ADMIN — FAMOTIDINE 20 MG: 20 TABLET, FILM COATED ORAL at 08:10

## 2020-08-17 RX ADMIN — CARVEDILOL 6.25 MG: 6.25 TABLET, FILM COATED ORAL at 08:10

## 2020-08-17 RX ADMIN — SPIRONOLACTONE 25 MG: 25 TABLET ORAL at 08:10

## 2020-08-17 RX ADMIN — LISINOPRIL 2.5 MG: 2.5 TABLET ORAL at 08:10

## 2020-08-17 ASSESSMENT — PAIN SCALES - GENERAL: PAINLEVEL_OUTOF10: 0

## 2020-08-17 NOTE — CONSULTS
Nutrition Education    · Verbally reviewed information with Patient  · Consult for Cardiac Diet education. Discussed Therapeutic Lifestyle Changes in depth with patient (Patient very active in conversation). · Written educational materials provided. · Contact name and number provided. · Refer to Patient Education activity for more details.     Electronically signed by Lili Rodarte RD, LD on 8/17/20 at 4:21 PM EDT    Contact: [phone]

## 2020-08-17 NOTE — PROGRESS NOTES
[facility]   Progress Note                   Date:   8/17/2020  Patient name: Louisa Braun  Date of admission:  8/15/2020  3:53 PM  MRN:   [id number]  YOB: 1965  PCP: Anaid Brooks DO    Reason for Admission: [facility] [[facility]]    Subjective:       Clinical Changes / Abnormalities: doing well. No chest pain or dyspnea. Has been feeling sweating and dizziness sometimes when standing up. Blood pressures remain somewhat elevated. Medications:   Scheduled Meds:   carvedilol  6.25 mg Oral BID WC    spironolactone  25 mg Oral Daily    sodium chloride flush  10 mL Intravenous 2 times per day    famotidine  20 mg Oral BID    atorvastatin  80 mg Oral Nightly    lisinopril  2.5 mg Oral Daily    aspirin  81 mg Oral Daily    ticagrelor  90 mg Oral BID    nicotine  1 patch Transdermal Daily     Continuous Infusions:    CBC:   Recent Labs     08/15/20  1600 08/16/20  0449 08/17/20  0415   WBC 17.8* 11.8* 12.2*   HGB 15.0 12.7* 13.4    238 220     BMP:    Recent Labs     08/15/20  1600 08/16/20  0449 08/17/20  0415    143 141   K 3.2* 3.6* 3.8    106 104   CO2 18* 25 25   BUN 14 12 9   CREATININE 0.88 0.65* 0.65*   GLUCOSE 175* 94 105*     Lipids:   Recent Labs     08/15/20  1840   CHOL 124   HDL 43     INR:   Recent Labs     08/15/20  1600 08/16/20  0449   INR 1.0 1.2       Objective:   Vitals: BP (!) 123/91   Pulse 87   Temp 96.6 °F (35.9 °C) (Temporal)   Resp 18   Ht 5' 8\" (1.727 m)   Wt 180 lb (81.6 kg)   SpO2 100%   BMI 27.37 kg/m²   General appearance: alert and cooperative with exam  HEENT: Head: Normocephalic, no lesions, without obvious abnormality. Neck: no JVD  Lungs: CTAB  Heart: RRR s1+s2, no murmurs  Abdomen: soft, non-tender  Extremities: no edema  Neurologic: not done    Cardiac cath 8/15/2020  Procedure Summary      Anterior STEMI. Moderate LV systolic dysfunctionl LVEF 35%.    Successful PTCA/GINI of distal and mid LAD. Successful PTCA/GINI of mid D1. Recommendations      Medical therapy as needed. Risk factor modification. Routine Post Stent Orders. TTE.    If LVEF is < 35%, will need LifeVest.        Assessment / Acute Cardiac Problems:   Anterior STEMI  Ischemic cardiomyopathy  H/o smoking now vaping    Patient Active Problem List:     Prostate cancer ([facility]     [facility]      Plan of Treatment:   Continue ASA and brilinta  Continue statin  TTE pending  Increase core 12.5 mg po BID  Continue ACE  Add aldactone 25mg po qday  Will need cardiac rehab  If LVEF < 35% on TTE, will need lifevest prior to discharge  Nutrition consult  CHF education        Electronically signed by Sarbjit Barcenas MD on 8/17/2020 at [place] [address] [facility]  [phone]

## 2020-08-17 NOTE — PLAN OF CARE
Patient alert and oriented. Up per self in room safely.    Problem: Pain:  Goal: Pain level will decrease  Description: Pain level will decrease  Outcome: Met This Shift  Goal: Control of acute pain  Description: Control of acute pain  Outcome: Met This Shift  Goal: Control of chronic pain  Description: Control of chronic pain  Outcome: Met This Shift     Problem: Falls - Risk of:  Goal: Will remain free from falls  Description: Will remain free from falls  Outcome: Met This Shift  Goal: Absence of physical injury  Description: Absence of physical injury  Outcome: Met This Shift

## 2020-08-17 NOTE — FLOWSHEET NOTE
Patient resting in bed; engages in conversation about his heart attack and his spiritual beliefs. Patient appears to be asking many existential questions in wake of his life threatening heart attack; appears to be coping normally; expresses gratitude for visit and conversation. Writer provides listening presence, supportive conversation, and spiritual conversation. Spiritual Care will follow as needed. 08/17/20 1203   Encounter Summary   Services provided to: Patient   Referral/Consult From: [address] [place] [address] Family members   Place of Religion None   Continue Visiting   (8/17/20)   Complexity of Encounter Moderate   Length of Encounter 15 minutes   Spiritual Assessment Completed Yes   Routine   Type Initial   Assessment Approachable;Spiritual struggle   Intervention Active listening;Explored feelings, thoughts, concerns; Discussed belief system/Faith practices/sue;Discussed illness/injury and it's impact   Outcome Expressed gratitude;Engaged in conversation;Expressed feelings/needs/concerns

## 2020-08-17 NOTE — FLOWSHEET NOTE
08/17/20 1702   Provider Notification   Reason for Communication Evaluate   Provider Name Dr Ventura File. Thierno   Provider Notification Physician   Method of Communication Call   Response Other (Comment)  (given prelim for ECHO)     RN telephoned MD to notify of ECHO results showing EF 45%.  [facility] for patient to d/c from cardiology standpoint and does not need Life Vest.

## 2020-08-17 NOTE — DISCHARGE SUMMARY
[address] [place] [address].,    Adult Hospitalist      Patient ID: Louisa Braun  MRN: [id number]     Acct:  [de-identified]       Patient's PCP: Anaid Brooks DO    Admit Date: 8/15/2020     Discharge Date:   8/17/2020    Admitting Physician: Dewayne Lockett MD    Discharge Physician: Albino Euceda MD     CONSULTANTS: Patient Care Team:  Anaid Brooks DO as PCP - General (Family Medicine)  Anaid Brooks DO as PCP - [facility]      Active Discharge Diagnoses:  · STEMI  · Coronary artery disease status post 4 stents on 8/15/2020  · Ischemic cardiomyopathy  · Hypertension  · Hyperlipidemia  · Former smoker      Hospital Course:  Louisa Braun is a 54 y.o.  male who presents with Chest Pain (x 1 hour PTA) and Shortness of Breath  This is a 54-year-old gentleman who is been admitted via emergency room, patient came to the ER with a complaint of having chest pain while getting haircut, patient tells me as he started having continuous chest pressure, he drove himself to the emergency room, further in the emergency room patient was noticed to have STEMI, cardiology was consulted and was taken to Cath Lab emergently patient tells me that he has history of smoking, admitted with STEMI, underwent cardiac catheterization by cardiology, patient received 4 stents while being in the hospital during cardiac catheterization, post cardiac catheterization patient did well patient was started on aspirin and Brilinta, also started on lisinopril Coreg and atorvastatin, at this point patient does not want to stay any longer in the hospital cardiology is okay with the discharge, for this reason patient is discharged from the hospital to be followed as an outpatient, patient was also recommended to quit smoking patient was told to have a very close follow-up with cardiology as an outpatient and if have any recurrence of chest pain come back to the emergency room immediately again discharged from the FIO2    Radiology:    Xr Chest Portable    Result Date: 8/15/2020  Mild vascular congestion. No focal consolidation is seen to suggest pneumonia. All radiological studies reviewed      Reviews of Symptoms:    A 10 point system is reviewed and  negative except described in hospital course    Physical Exam:    Vitals:  /82   Pulse 87   Temp 97.6 °F (36.4 °C) (Temporal)   Resp 18   Ht 5' 8\" (1.727 m)   Wt 180 lb (81.6 kg)   SpO2 100%   BMI 27.37 kg/m²   Temp (24hrs), Av.2 °F (36.2 °C), Min:96.6 °F (35.9 °C), Max:97.6 °F (36.4 °C)      General appearance - alert, well appearing, and in no acute distress  Mental status - oriented to person, place, and time with normal affect  Head - normocephalic and atraumatic  Eyes - pupils equal and reactive, extraocular eye movements intact, conjunctiva clear  Ears - hearing appears to be intact  Nose - no drainage noted  Mouth - mucous membranes moist  Neck - supple, no carotid bruits, thyroid not palpable  Chest - clear to auscultation, normal effort  Heart - normal rate, regular rhythm, no murmur  Abdomen - soft, nontender, nondistended, bowel sounds present all four quadrants, no masses, hepatomegaly or splenomegaly  Neurological - normal speech, no focal findings or movement disorder noted, cranial nerves II through XII grossly intact  Extremities - peripheral pulses palpable, no pedal edema or calf pain with palpation  Skin - no gross lesions, rashes, or induration noted      Consults:  IP CONSULT TO HOSPITALIST  IP CONSULT TO DIETITIAN    Disposition: Home    Discharged Condition: Stable    Follow Up: Ilana Chavira DO  [address] [address] [phone]          [facility]   [address] [place] [address], [facility]  [phone]    they will call you to arrange cardiac rehab     MD Melissa Solis. Jaren Escalante [place]  [phone]    Go on 2020  at 2pm with nurse Chan rivero       Lab Frequency Next Occurrence   Up with assistance PRN    Intake and output EVERY 8 HOURS    Initiate Oxygen Therapy Protocol DAILY (RT)    Pulse Oximetry Spot Check PRN    Basic Metabolic Panel w/ Reflex to MG DAILY    CBC auto differential DAILY    Initiate Oxygen Therapy Protocol DAILY (RT)    Nasal Cannula Oxygen DAILY (RT)          Diet: DIET GENERAL;    Discharge Medications:    Hammad Perry   Home Medication Instructions WAK:[id number]    Printed on:08/17/20 2053   Medication Information                      aspirin 81 MG EC tablet  Take 1 tablet by mouth daily             atorvastatin (LIPITOR) 80 MG tablet  Take 1 tablet by mouth nightly             carvedilol (COREG) 12.5 MG tablet  Take 1 tablet by mouth 2 times daily (with meals)             Cholecalciferol (VITAMIN D PO)  Take 4,000 Units by mouth daily             Cyanocobalamin (VITAMIN B12 PO)  Take 1 tablet by mouth daily             famotidine (PEPCID) 20 MG tablet  Take 1 tablet by mouth 2 times daily             KRILL OIL PO  Take 1 capsule by mouth daily             lisinopril (PRINIVIL;ZESTRIL) 2.5 MG tablet  Take 1 tablet by mouth daily             Multiple Vitamin (MULTI-VITAMIN DAILY) TABS  Take 1 tablet by mouth daily             Omega-3 Fatty Acids (OMEGA 3 PO)  Take 2 capsules by mouth daily             spironolactone (ALDACTONE) 25 MG tablet  Take 1 tablet by mouth daily             ticagrelor (BRILINTA) 90 MG TABS tablet  Take 1 tablet by mouth 2 times daily                 Code Status:  Full Code    Time Spent on discharge is  35 mins in patient examination, evaluation, counseling as well as medication reconciliation, prescriptions for required medications, discharge plan and follow up. Electronically signed by Albino Euceda MD on 8/17/2020 at 5:36 PM     Thank you Dr. Anaid Brooks DO for the opportunity to be involved in this patient's care.     This note was created with the assistance of

## 2020-08-18 VITALS
BODY MASS INDEX: 27.28 KG/M2 | RESPIRATION RATE: 18 BRPM | SYSTOLIC BLOOD PRESSURE: 126 MMHG | WEIGHT: 180 LBS | DIASTOLIC BLOOD PRESSURE: 82 MMHG | TEMPERATURE: 97.6 F | OXYGEN SATURATION: 100 % | HEIGHT: 68 IN | HEART RATE: 87 BPM

## 2020-08-19 ENCOUNTER — APPOINTMENT (OUTPATIENT)
Dept: GENERAL RADIOLOGY | Age: 55
DRG: 281 | End: 2020-08-19
Payer: COMMERCIAL

## 2020-08-19 ENCOUNTER — HOSPITAL ENCOUNTER (INPATIENT)
Age: 55
LOS: 1 days | Discharge: HOME | DRG: 281 | End: 2020-08-21
Attending: EMERGENCY MEDICINE | Admitting: FAMILY MEDICINE
Payer: COMMERCIAL

## 2020-08-19 LAB
ABSOLUTE EOS #: 0.35 K/UL (ref 0–0.44)
ABSOLUTE IMMATURE GRANULOCYTE: 0.05 K/UL (ref 0–0.3)
ABSOLUTE LYMPH #: 2.81 K/UL (ref 1.1–3.7)
ABSOLUTE MONO #: 0.97 K/UL (ref 0.1–1.2)
BASOPHILS # BLD: 0 % (ref 0–2)
BASOPHILS ABSOLUTE: 0.03 K/UL (ref 0–0.2)
DIFFERENTIAL TYPE: ABNORMAL
EOSINOPHILS RELATIVE PERCENT: 3 % (ref 1–4)
HCT VFR BLD CALC: 39.8 % (ref 40.7–50.3)
HEMOGLOBIN: 13.5 G/DL (ref 13–17)
IMMATURE GRANULOCYTES: 0 %
LYMPHOCYTES # BLD: 24 % (ref 24–43)
MCH RBC QN AUTO: 30.1 PG (ref 25.2–33.5)
MCHC RBC AUTO-ENTMCNC: 33.9 G/DL (ref 28.4–34.8)
MCV RBC AUTO: 88.8 FL (ref 82.6–102.9)
MONOCYTES # BLD: 8 % (ref 3–12)
NRBC AUTOMATED: 0 PER 100 WBC
PDW BLD-RTO: 12.2 % (ref 11.8–14.4)
PLATELET # BLD: 275 K/UL (ref 138–453)
PLATELET ESTIMATE: ABNORMAL
PMV BLD AUTO: 10.5 FL (ref 8.1–13.5)
RBC # BLD: 4.48 M/UL (ref 4.21–5.77)
RBC # BLD: ABNORMAL 10*6/UL
SEG NEUTROPHILS: 65 % (ref 36–65)
SEGMENTED NEUTROPHILS ABSOLUTE COUNT: 7.57 K/UL (ref 1.5–8.1)
WBC # BLD: 11.8 K/UL (ref 3.5–11.3)
WBC # BLD: ABNORMAL 10*3/UL

## 2020-08-19 PROCEDURE — 71045 X-RAY EXAM CHEST 1 VIEW: CPT

## 2020-08-19 PROCEDURE — 99285 EMERGENCY DEPT VISIT HI MDM: CPT

## 2020-08-19 PROCEDURE — 84484 ASSAY OF TROPONIN QUANT: CPT

## 2020-08-19 PROCEDURE — 80053 COMPREHEN METABOLIC PANEL: CPT

## 2020-08-19 PROCEDURE — 83880 ASSAY OF NATRIURETIC PEPTIDE: CPT

## 2020-08-19 PROCEDURE — 85025 COMPLETE CBC W/AUTO DIFF WBC: CPT

## 2020-08-19 PROCEDURE — 93005 ELECTROCARDIOGRAM TRACING: CPT | Performed by: EMERGENCY MEDICINE

## 2020-08-19 RX ORDER — 0.9 % SODIUM CHLORIDE 0.9 %
1000 INTRAVENOUS SOLUTION INTRAVENOUS ONCE
Status: COMPLETED | OUTPATIENT
Start: 2020-08-19 | End: 2020-08-20

## 2020-08-19 NOTE — PROGRESS NOTES
Physician Progress Note      PATIENT:               Nicole Olmos  CSN #:                  [id number]  :                       1965  ADMIT DATE:       8/15/2020 3:53 PM  [address] [place] DATE:        2020 6:10 PM  RESPONDING  PROVIDER #:        Brittnee Arias MD          QUERY TEXT:    Pt admitted with a STEMI and has CHF documented. If possible, please document   in progress notes and discharge summary further specificity regarding the type   and acuity of CHF:    The medical record reflects the following:  Risk Factors: STEMI, HTN  Clinical Indicators: Chest Xray shows Mild vascular congestion, had cardiac   cath with stents and showed Moderate LV systolic dysfunction, LVEF 35%, lungs   clear, no edema  Treatment: increase Coreg 3.125 to 6.250, Lisinopril, Aldactone, CHF   education, cardiac rehab    Thank you! RJ Lane, CCDS, [facility]  [phone]  Options provided:  -- Chronic Systolic CHF/HFrEF  -- Acute on Chronic Systolic CHF/HFrEF  -- Other - I will add my own diagnosis  -- Disagree - Not applicable / Not valid  -- Disagree - Clinically unable to determine / Unknown  -- Refer to Clinical Documentation Reviewer    PROVIDER RESPONSE TEXT:    This patient is in acute on chronic systolic CHF/HFrEF.     Query created by: Owen Mcclelland on 2020 9:53 AM      Electronically signed by:  Brittnee Arias MD 2020 8:56 AM

## 2020-08-20 ENCOUNTER — APPOINTMENT (OUTPATIENT)
Dept: CT IMAGING | Age: 55
DRG: 281 | End: 2020-08-20
Payer: COMMERCIAL

## 2020-08-20 PROBLEM — R07.9 CHEST PAIN: Status: ACTIVE | Noted: 2020-08-20

## 2020-08-20 LAB
ABSOLUTE EOS #: 0.26 K/UL (ref 0–0.44)
ABSOLUTE IMMATURE GRANULOCYTE: 0.05 K/UL (ref 0–0.3)
ABSOLUTE LYMPH #: 2.55 K/UL (ref 1.1–3.7)
ABSOLUTE MONO #: 0.85 K/UL (ref 0.1–1.2)
ALBUMIN SERPL-MCNC: 4 G/DL (ref 3.5–5.2)
ALBUMIN SERPL-MCNC: 4.2 G/DL (ref 3.5–5.2)
ALBUMIN SERPL-MCNC: 4.6 G/DL (ref 3.5–5.2)
ALBUMIN/GLOBULIN RATIO: ABNORMAL (ref 1–2.5)
ALP BLD-CCNC: 69 U/L (ref 40–129)
ALP BLD-CCNC: 70 U/L (ref 40–129)
ALP BLD-CCNC: 76 U/L (ref 40–129)
ALT SERPL-CCNC: 32 U/L (ref 5–41)
ALT SERPL-CCNC: 34 U/L (ref 5–41)
ALT SERPL-CCNC: 36 U/L (ref 5–41)
ANION GAP SERPL CALCULATED.3IONS-SCNC: 13 MMOL/L (ref 9–17)
ANION GAP SERPL CALCULATED.3IONS-SCNC: 14 MMOL/L (ref 9–17)
ANTI-XA UNFRAC HEPARIN: 0.45 IU/L (ref 0.3–0.7)
ANTI-XA UNFRAC HEPARIN: 0.48 IU/L (ref 0.3–0.7)
AST SERPL-CCNC: 59 U/L
AST SERPL-CCNC: 71 U/L
AST SERPL-CCNC: 77 U/L
BASOPHILS # BLD: 0 % (ref 0–2)
BASOPHILS ABSOLUTE: <0.03 K/UL (ref 0–0.2)
BILIRUB SERPL-MCNC: 1.72 MG/DL (ref 0.3–1.2)
BILIRUB SERPL-MCNC: 1.73 MG/DL (ref 0.3–1.2)
BILIRUB SERPL-MCNC: 1.88 MG/DL (ref 0.3–1.2)
BILIRUBIN DIRECT: 0.28 MG/DL
BILIRUBIN, INDIRECT: 1.45 MG/DL (ref 0–1)
BNP INTERPRETATION: ABNORMAL
BUN BLDV-MCNC: 16 MG/DL (ref 6–20)
BUN BLDV-MCNC: 19 MG/DL (ref 6–20)
BUN/CREAT BLD: 24 (ref 9–20)
BUN/CREAT BLD: 26 (ref 9–20)
CALCIUM SERPL-MCNC: 10 MG/DL (ref 8.6–10.4)
CALCIUM SERPL-MCNC: 9.6 MG/DL (ref 8.6–10.4)
CHLORIDE BLD-SCNC: 98 MMOL/L (ref 98–107)
CHLORIDE BLD-SCNC: 99 MMOL/L (ref 98–107)
CO2: 22 MMOL/L (ref 20–31)
CO2: 24 MMOL/L (ref 20–31)
CREAT SERPL-MCNC: 0.68 MG/DL (ref 0.7–1.2)
CREAT SERPL-MCNC: 0.74 MG/DL (ref 0.7–1.2)
DIFFERENTIAL TYPE: ABNORMAL
EKG ATRIAL RATE: 81 BPM
EKG P AXIS: 66 DEGREES
EKG P-R INTERVAL: 122 MS
EKG Q-T INTERVAL: 378 MS
EKG QRS DURATION: 116 MS
EKG QTC CALCULATION (BAZETT): 439 MS
EKG R AXIS: 122 DEGREES
EKG T AXIS: 65 DEGREES
EKG VENTRICULAR RATE: 81 BPM
EOSINOPHILS RELATIVE PERCENT: 2 % (ref 1–4)
GFR AFRICAN AMERICAN: >60 ML/MIN
GFR AFRICAN AMERICAN: >60 ML/MIN
GFR NON-AFRICAN AMERICAN: >60 ML/MIN
GFR NON-AFRICAN AMERICAN: >60 ML/MIN
GFR SERPL CREATININE-BSD FRML MDRD: ABNORMAL ML/MIN/{1.73_M2}
GLOBULIN: ABNORMAL G/DL (ref 1.5–3.8)
GLUCOSE BLD-MCNC: 92 MG/DL (ref 70–99)
GLUCOSE BLD-MCNC: 95 MG/DL (ref 70–99)
HCT VFR BLD CALC: 38.6 % (ref 40.7–50.3)
HEMOGLOBIN: 12.9 G/DL (ref 13–17)
IMMATURE GRANULOCYTES: 1 %
INR BLD: 1.2
LYMPHOCYTES # BLD: 24 % (ref 24–43)
MCH RBC QN AUTO: 29.9 PG (ref 25.2–33.5)
MCHC RBC AUTO-ENTMCNC: 33.4 G/DL (ref 28.4–34.8)
MCV RBC AUTO: 89.6 FL (ref 82.6–102.9)
MONOCYTES # BLD: 8 % (ref 3–12)
NRBC AUTOMATED: 0 PER 100 WBC
PDW BLD-RTO: 12.2 % (ref 11.8–14.4)
PLATELET # BLD: 245 K/UL (ref 138–453)
PLATELET ESTIMATE: ABNORMAL
PMV BLD AUTO: 9.9 FL (ref 8.1–13.5)
POTASSIUM SERPL-SCNC: 3.8 MMOL/L (ref 3.7–5.3)
POTASSIUM SERPL-SCNC: 4.1 MMOL/L (ref 3.7–5.3)
PRO-BNP: 662 PG/ML
PROTHROMBIN TIME: 15.4 SEC (ref 11.5–14.2)
RBC # BLD: 4.31 M/UL (ref 4.21–5.77)
RBC # BLD: ABNORMAL 10*6/UL
SEG NEUTROPHILS: 65 % (ref 36–65)
SEGMENTED NEUTROPHILS ABSOLUTE COUNT: 7.01 K/UL (ref 1.5–8.1)
SODIUM BLD-SCNC: 135 MMOL/L (ref 135–144)
SODIUM BLD-SCNC: 135 MMOL/L (ref 135–144)
TOTAL PROTEIN: 6.8 G/DL (ref 6.4–8.3)
TOTAL PROTEIN: 7.1 G/DL (ref 6.4–8.3)
TOTAL PROTEIN: 7.6 G/DL (ref 6.4–8.3)
TROPONIN INTERP: ABNORMAL
TROPONIN T: ABNORMAL NG/ML
TROPONIN, HIGH SENSITIVITY: 392 NG/L (ref 0–22)
TROPONIN, HIGH SENSITIVITY: 468 NG/L (ref 0–22)
TROPONIN, HIGH SENSITIVITY: 517 NG/L (ref 0–22)
TROPONIN, HIGH SENSITIVITY: 522 NG/L (ref 0–22)
WBC # BLD: 10.7 K/UL (ref 3.5–11.3)
WBC # BLD: ABNORMAL 10*3/UL

## 2020-08-20 PROCEDURE — 84484 ASSAY OF TROPONIN QUANT: CPT

## 2020-08-20 PROCEDURE — 80053 COMPREHEN METABOLIC PANEL: CPT

## 2020-08-20 PROCEDURE — 2580000003 HC RX 258: Performed by: FAMILY MEDICINE

## 2020-08-20 PROCEDURE — 6360000002 HC RX W HCPCS: Performed by: INTERNAL MEDICINE

## 2020-08-20 PROCEDURE — 96360 HYDRATION IV INFUSION INIT: CPT

## 2020-08-20 PROCEDURE — 6360000004 HC RX CONTRAST MEDICATION: Performed by: EMERGENCY MEDICINE

## 2020-08-20 PROCEDURE — 2580000003 HC RX 258: Performed by: EMERGENCY MEDICINE

## 2020-08-20 PROCEDURE — 85025 COMPLETE CBC W/AUTO DIFF WBC: CPT

## 2020-08-20 PROCEDURE — 71260 CT THORAX DX C+: CPT

## 2020-08-20 PROCEDURE — 1200000000 HC SEMI PRIVATE

## 2020-08-20 PROCEDURE — 6370000000 HC RX 637 (ALT 250 FOR IP): Performed by: FAMILY MEDICINE

## 2020-08-20 PROCEDURE — 36415 COLL VENOUS BLD VENIPUNCTURE: CPT

## 2020-08-20 PROCEDURE — 85520 HEPARIN ASSAY: CPT

## 2020-08-20 PROCEDURE — 85610 PROTHROMBIN TIME: CPT

## 2020-08-20 PROCEDURE — 96361 HYDRATE IV INFUSION ADD-ON: CPT

## 2020-08-20 PROCEDURE — 80076 HEPATIC FUNCTION PANEL: CPT

## 2020-08-20 RX ORDER — HEPARIN SODIUM 10000 [USP'U]/100ML
18 INJECTION, SOLUTION INTRAVENOUS CONTINUOUS
Status: DISCONTINUED | OUTPATIENT
Start: 2020-08-20 | End: 2020-08-21

## 2020-08-20 RX ORDER — HEPARIN SODIUM 5000 [USP'U]/ML
80 INJECTION, SOLUTION INTRAVENOUS; SUBCUTANEOUS ONCE
Status: COMPLETED | OUTPATIENT
Start: 2020-08-20 | End: 2020-08-20

## 2020-08-20 RX ORDER — 0.9 % SODIUM CHLORIDE 0.9 %
500 INTRAVENOUS SOLUTION INTRAVENOUS ONCE
Status: COMPLETED | OUTPATIENT
Start: 2020-08-20 | End: 2020-08-20

## 2020-08-20 RX ORDER — ATORVASTATIN CALCIUM 80 MG/1
80 TABLET, FILM COATED ORAL NIGHTLY
Status: DISCONTINUED | OUTPATIENT
Start: 2020-08-20 | End: 2020-08-21 | Stop reason: HOSPADM

## 2020-08-20 RX ORDER — ASPIRIN 81 MG/1
81 TABLET ORAL DAILY
Status: DISCONTINUED | OUTPATIENT
Start: 2020-08-20 | End: 2020-08-21 | Stop reason: HOSPADM

## 2020-08-20 RX ORDER — ACETAMINOPHEN 325 MG/1
650 TABLET ORAL EVERY 4 HOURS PRN
Status: DISCONTINUED | OUTPATIENT
Start: 2020-08-20 | End: 2020-08-21 | Stop reason: HOSPADM

## 2020-08-20 RX ORDER — 0.9 % SODIUM CHLORIDE 0.9 %
80 INTRAVENOUS SOLUTION INTRAVENOUS ONCE
Status: COMPLETED | OUTPATIENT
Start: 2020-08-20 | End: 2020-08-20

## 2020-08-20 RX ORDER — LISINOPRIL 2.5 MG/1
2.5 TABLET ORAL DAILY
Status: DISCONTINUED | OUTPATIENT
Start: 2020-08-20 | End: 2020-08-21 | Stop reason: HOSPADM

## 2020-08-20 RX ORDER — CARVEDILOL 12.5 MG/1
12.5 TABLET ORAL 2 TIMES DAILY WITH MEALS
Status: DISCONTINUED | OUTPATIENT
Start: 2020-08-20 | End: 2020-08-21 | Stop reason: HOSPADM

## 2020-08-20 RX ORDER — HEPARIN SODIUM 5000 [USP'U]/ML
80 INJECTION, SOLUTION INTRAVENOUS; SUBCUTANEOUS PRN
Status: DISCONTINUED | OUTPATIENT
Start: 2020-08-20 | End: 2020-08-21 | Stop reason: ALTCHOICE

## 2020-08-20 RX ORDER — SODIUM CHLORIDE 0.9 % (FLUSH) 0.9 %
10 SYRINGE (ML) INJECTION ONCE
Status: COMPLETED | OUTPATIENT
Start: 2020-08-20 | End: 2020-08-20

## 2020-08-20 RX ORDER — FAMOTIDINE 20 MG/1
20 TABLET, FILM COATED ORAL 2 TIMES DAILY
Status: DISCONTINUED | OUTPATIENT
Start: 2020-08-20 | End: 2020-08-21 | Stop reason: HOSPADM

## 2020-08-20 RX ORDER — SODIUM CHLORIDE 0.9 % (FLUSH) 0.9 %
10 SYRINGE (ML) INJECTION PRN
Status: DISCONTINUED | OUTPATIENT
Start: 2020-08-20 | End: 2020-08-21 | Stop reason: HOSPADM

## 2020-08-20 RX ORDER — SODIUM CHLORIDE 0.9 % (FLUSH) 0.9 %
10 SYRINGE (ML) INJECTION EVERY 12 HOURS SCHEDULED
Status: DISCONTINUED | OUTPATIENT
Start: 2020-08-20 | End: 2020-08-21 | Stop reason: HOSPADM

## 2020-08-20 RX ORDER — HEPARIN SODIUM 5000 [USP'U]/ML
40 INJECTION, SOLUTION INTRAVENOUS; SUBCUTANEOUS PRN
Status: DISCONTINUED | OUTPATIENT
Start: 2020-08-20 | End: 2020-08-21 | Stop reason: ALTCHOICE

## 2020-08-20 RX ORDER — SPIRONOLACTONE 25 MG/1
25 TABLET ORAL DAILY
Status: DISCONTINUED | OUTPATIENT
Start: 2020-08-20 | End: 2020-08-21 | Stop reason: HOSPADM

## 2020-08-20 RX ADMIN — TICAGRELOR 90 MG: 90 TABLET ORAL at 08:59

## 2020-08-20 RX ADMIN — CARVEDILOL 12.5 MG: 12.5 TABLET, FILM COATED ORAL at 08:59

## 2020-08-20 RX ADMIN — FAMOTIDINE 20 MG: 20 TABLET, FILM COATED ORAL at 08:59

## 2020-08-20 RX ADMIN — SPIRONOLACTONE 25 MG: 25 TABLET ORAL at 08:59

## 2020-08-20 RX ADMIN — IOPAMIDOL 75 ML: 755 INJECTION, SOLUTION INTRAVENOUS at 00:55

## 2020-08-20 RX ADMIN — FAMOTIDINE 20 MG: 20 TABLET, FILM COATED ORAL at 19:50

## 2020-08-20 RX ADMIN — ATORVASTATIN CALCIUM 80 MG: 80 TABLET, FILM COATED ORAL at 19:50

## 2020-08-20 RX ADMIN — ASPIRIN 81 MG: 81 TABLET, COATED ORAL at 08:59

## 2020-08-20 RX ADMIN — SODIUM CHLORIDE 500 ML: 9 INJECTION, SOLUTION INTRAVENOUS at 05:49

## 2020-08-20 RX ADMIN — CARVEDILOL 12.5 MG: 12.5 TABLET, FILM COATED ORAL at 17:12

## 2020-08-20 RX ADMIN — SODIUM CHLORIDE 80 ML: 9 INJECTION, SOLUTION INTRAVENOUS at 00:55

## 2020-08-20 RX ADMIN — LISINOPRIL 2.5 MG: 2.5 TABLET ORAL at 08:59

## 2020-08-20 RX ADMIN — HEPARIN SODIUM 6350 UNITS: 5000 INJECTION INTRAVENOUS; SUBCUTANEOUS at 05:53

## 2020-08-20 RX ADMIN — SODIUM CHLORIDE 1000 ML: 9 INJECTION, SOLUTION INTRAVENOUS at 00:07

## 2020-08-20 RX ADMIN — TICAGRELOR 90 MG: 90 TABLET ORAL at 19:50

## 2020-08-20 RX ADMIN — HEPARIN SODIUM 18 UNITS/KG/HR: 10000 INJECTION, SOLUTION INTRAVENOUS at 06:36

## 2020-08-20 RX ADMIN — Medication 10 ML: at 09:00

## 2020-08-20 RX ADMIN — Medication 10 ML: at 00:55

## 2020-08-20 ASSESSMENT — PAIN SCALES - GENERAL
PAINLEVEL_OUTOF10: 0

## 2020-08-20 NOTE — H&P
History & Physical  [facility].,    Adult Hospitalist      Name: Giulia Delgado  MRN: [id number]     [place]: [de-identified]  [facility]    Admit Date: 8/19/2020 11:37 PM  PCP: Ro Rowell, DO    Primary Problem  Active Problems:    Chest pain  Resolved Problems:    * No resolved hospital problems. *        Assesment:     · Chest pain  · Recent coronary stent placement   · Coronary artery disease   · Mixed hyperlipidemia  · Gastroesophageal reflux disease   · Osteoarthritis multiple joints  · H/o CA Prostate  · Past smoker  · THC abuse  · Vapor assisted device use for inhalation         Plan:     · Admit to Progressive  · Monitor vitals closely  · Keep SpO2 above 90%  · Pain control  · MANDO  · BB/ACE/ Statin  · IV fluids  · Serial cardiac enzymes  · Serial EKG  · Cardiology consulted in ER  · Resume ASA  Resume Atorvastatin  · Resume Coreg  · Famotidine  · Resume Lisinopril  · Resume Spironolactone  · Resume Brilinta  · Cardiology started Heparin gtt  · Check CBC, BMP  · LFTs  · Diet cardiac  · Incentive spirometry   · DVT and GI prophylaxis. Chief Complaint:     Chief Complaint   Patient presents with    Chest Pain     chest pain with deep breath that began tonight. hx stents 4 days ago         History of Present Illness:      Giulia Delgado is a 54 y.o.  male who presents with Chest Pain (chest pain with deep breath that began tonight. hx stents 4 days ago)    Pt had been recently discharged from the hospital after suffering from an MI and requiring 4 coronary stents. Pt says he was laying down when he felt a discomfort in the mid to right side of his chest. Says it was mild to moderate and felt at the end of expiration. Pain was not associated with any diaphoresis, nausea, vomiting or abdominal pain. Pain did not radiate to his neck, shoulder, back or abdomen    Pt has several questions about nicotine and marijuana use.  He says he does not plan to use nicotine but is wondering if he can use left over marijuana. Apparently he also vapes marijuana. Pt says pain has resolved at this time. He denies any dyspnea, orthopnea, cough, wheezing, nausea, vomiting, headache, fever or photophobia    I have personally reviewed the past medical history, past surgical history, medications, social history, and family history, and summarized in the note. Review of Systems:     All 10 point system is reviewed and negative otherwise mentioned in HPI. Past Medical History:     Past Medical History:   Diagnosis Date    Prostate CA ([address] 04/2018    PSA elevation 03/2018    RBBB     Saw cardiologist at [facility], \"normal\" for his age (per patient).  Snores     Wears glasses         Past Surgical History:     Past Surgical History:   Procedure Laterality Date    KNEE ARTHROSCOPY Right 1991    KNEE SURGERY Right     tendon reconstruction    SD LAP,PROSTATECTOMY,RADICAL,W/NERVE SPARE,INCL ROBOTIC N/A 5/23/2018    XI ROBOTIC LAPAROSCOPIC PROSTATECTOMY WITH PELVIC LYMPHNODE DISSECTION performed by Luis F Ocampo MD at [address]  03/2018    PROSTATECTOMY  05/23/2018    ROBOTIC ASSISTED  WITH PELVIC LYMPH NODE DISECTION. Medications Prior to Admission:       Prior to Admission medications    Medication Sig Start Date End Date Taking?  Authorizing Provider   aspirin 81 MG EC tablet Take 1 tablet by mouth daily 8/18/20   Walt Quigley MD   atorvastatin (LIPITOR) 80 MG tablet Take 1 tablet by mouth nightly 8/17/20   Walt Quigley MD   carvedilol (COREG) 12.5 MG tablet Take 1 tablet by mouth 2 times daily (with meals) 8/17/20   Walt Quigley MD   famotidine (PEPCID) 20 MG tablet Take 1 tablet by mouth 2 times daily 8/17/20   Walt Quigley MD   lisinopril (PRINIVIL;ZESTRIL) 2.5 MG tablet Take 1 tablet by mouth daily 8/18/20   Walt Quigley MD   spironolactone (ALDACTONE) 25 MG tablet Take 1 tablet by mouth daily 8/18/20   Walt Quigley MD   ticagrelor (BRILINTA) 90 MG TABS tablet Take 1 tablet by mouth 2 times daily 20   Gisele William MD   Multiple Vitamin (MULTI-VITAMIN DAILY) TABS Take 1 tablet by mouth daily    Historical Provider, MD   Cyanocobalamin (VITAMIN B12 PO) Take 1 tablet by mouth daily    Historical Provider, MD   KRILL OIL PO Take 1 capsule by mouth daily    Historical Provider, MD   Cholecalciferol (VITAMIN D PO) Take 4,000 Units by mouth daily    Historical Provider, MD   Omega-3 Fatty Acids (OMEGA 3 PO) Take 2 capsules by mouth daily    Historical Provider, MD        Allergies:       Patient has no known allergies. Social History:     Tobacco:    reports that he quit smoking about 8 years ago. His smoking use included cigarettes. He started smoking about 43 years ago. He smoked 0.50 packs per day. He has never used smokeless tobacco.  Alcohol:      reports no history of alcohol use. Drug Use:  reports current drug use. Drug: Marijuana.     Family History:     Family History   Problem Relation Age of Onset    No Known Problems Father     No Known Problems Maternal Grandmother     No Known Problems Maternal Grandfather     No Known Problems Paternal Grandmother     No Known Problems Paternal Grandfather          Physical Exam:     Vitals:  /74   Pulse 78   Temp 98.8 °F (37.1 °C) (Oral)   Resp 16   Ht 5' 7\" (1.702 m)   Wt 175 lb (79.4 kg)   SpO2 96%   BMI 27.41 kg/m²   Temp (24hrs), Av.7 °F (37.1 °C), Min:98.5 °F (36.9 °C), Max:98.8 °F (37.1 °C)          General appearance - alert, well appearing, and in no acute distress  Mental status - oriented to person, place, and time with normal affect  Head - normocephalic and atraumatic  Eyes - pupils equal and reactive, extraocular eye movements intact, conjunctiva clear  Ears - hearing appears to be intact  Nose - no drainage noted  Mouth - mucous membranes moist  Neck - supple, no carotid bruits, thyroid not palpable  Chest - clear to auscultation, normal effort  Heart - normal rate, regular rhythm, no murmur  Abdomen - soft, nontender, nondistended, bowel sounds present all four quadrants, no masses, hepatomegaly or splenomegaly  Neurological - normal speech, no focal findings or movement disorder noted, cranial nerves II through XII grossly intact  Extremities - peripheral pulses palpable, no pedal edema or calf pain with palpation  Skin - no gross lesions, rashes, or induration noted        Data:     Labs:    Hematology:  Recent Labs     08/19/20 2350 08/20/20 0426 08/20/20  0613   WBC 11.8* 10.7  --    RBC 4.48 4.31  --    HGB 13.5 12.9*  --    HCT 39.8* 38.6*  --    MCV 88.8 89.6  --    MCH 30.1 29.9  --    MCHC 33.9 33.4  --    RDW 12.2 12.2  --     245  --    MPV 10.5 9.9  --    INR  --   --  1.2     Chemistry:  Recent Labs     08/19/20 2350 08/20/20  0145 08/20/20  0426     --  135   K 4.1  --  3.8   CL 98  --  99   CO2 24  --  22   GLUCOSE 95  --  92   BUN 19  --  16   CREATININE 0.74  --  0.68*   ANIONGAP 13  --  14   LABGLOM >60  --  >60   GFRAA >60  --  >60   CALCIUM 10.0  --  9.6   PROBNP 662*  --   --    TROPHS 517* 522* 468*     Recent Labs     08/19/20 2350 08/20/20 0426   PROT 7.6 7.1   LABALBU 4.6 4.2   AST 77* 71*   ALT 36 34   ALKPHOS 76 70   BILITOT 1.72* 1.88*       Lab Results   Component Value Date    INR 1.2 08/20/2020    INR 1.2 08/16/2020    INR 1.0 08/15/2020    PROTIME 15.4 (H) 08/20/2020    PROTIME 14.9 (H) 08/16/2020    PROTIME 13.1 08/15/2020       Lab Results   Component Value Date/Time    SPECIAL NOT REPORTED 05/23/2018 10:52 AM     Lab Results   Component Value Date/Time    CULTURE NO GROWTH 05/23/2018 10:52 AM    CULTURE  05/23/2018 10:52 AM [employer] [address] [facility], [address] [place] [address] [phone]       No results found for: POCPH, PHART, PH, POCPCO2, LEE9SKY, PCO2, POCPO2, PO2ART, PO2, POCHCO3, XZU7CMU, HCO3, NBEA, PBEA, BEART, BE, THGBART, THB, GAF0WOR, JQPF7HYS, D2HYBJBL, O2SAT, FIO2    Radiology:    Xr Chest

## 2020-08-20 NOTE — FLOWSHEET NOTE
08/20/20 0435   Provider Notification   Reason for Communication Evaluate   Provider Name Dr. Cruz Dias. Thierno   Provider Notification Physician   Method of Communication Call   Response See orders   Notification Time [address]   Dr. Cruz Dias. Thierno notified of pt admission. Heparin High dose drip protocols ordered and started . RN will monitor and notify as necessary.

## 2020-08-20 NOTE — FLOWSHEET NOTE
Writer wander. Patient resting in bed. There are no visitors. Patient is receptive, and engages in conversation. He shares about his recent admission, as well as this one. He expresses some anxiety about this admission, but appears to be coping appropriately. He states that he hopes to be discharged today. Patient shares about his wife and daughter, as well as beliefs about life, God, and Taoist. Writer offers emotional support and listening presence, and patient expresses thanks. Spiritual care will follow as needed. 08/20/20 1350   Encounter Summary   Services provided to: Patient   Referral/Consult From: Wander   Support System Spouse   Continue Visiting   (8/20/20)   Complexity of Encounter Moderate   Length of Encounter 30 minutes   Spiritual Assessment Completed Yes   Routine   Type Initial   Assessment Approachable;Coping   Intervention Active listening;Explored feelings, thoughts, concerns; Discussed illness/injury and it's impact; Discussed belief system/Druze practices/sue;Discussed meaning/purpose   Outcome Engaged in conversation;Expressed feelings/needs/concerns;Coping;Expressed gratitude

## 2020-08-20 NOTE — PROGRESS NOTES
Dr. Oralia Jansen updated on Dr. Abhishek Abarca notified of new consult last night and gave orders. Writer noticed this morning Dr. Abhishek Abarca was not on treatment team. Writer added him to treatment team, but Dr. Oralia Jansen had rounded at 0630 this a.m. He states he will try to stop by to see patient today. Update given. If not keep patient NPO at midnight and call with any concerns.

## 2020-08-20 NOTE — CARE COORDINATION
to look at ways for him to relax because he has been thru a lot of life changing events in the last year or two. Discussed light exercise such as walking, yoga or meditation.            Electronically signed by Vanessa Buck RN on 8/20/20 at 2:25 PM EDT

## 2020-08-20 NOTE — FLOWSHEET NOTE
08/20/20 0328   Provider Notification   Reason for Communication Evaluate   Provider Name Dr. Pancho Yung   Provider Notification Physician   Method of Communication Call   Response See orders   Notification Time [phone]   Dr. Pancho Yung notified of pt admit, home medications entered and Rn asked to notify Dr Tee Morales of admit.

## 2020-08-20 NOTE — ED PROVIDER NOTES
tablet by mouth 2 times daily (with meals)  Qty: 60 tablet, Refills: 3      famotidine (PEPCID) 20 MG tablet Take 1 tablet by mouth 2 times daily  Qty: 60 tablet, Refills: 3      lisinopril (PRINIVIL;ZESTRIL) 2.5 MG tablet Take 1 tablet by mouth daily  Qty: 30 tablet, Refills: 3      spironolactone (ALDACTONE) 25 MG tablet Take 1 tablet by mouth daily  Qty: 30 tablet, Refills: 3      ticagrelor (BRILINTA) 90 MG TABS tablet Take 1 tablet by mouth 2 times daily  Qty: 60 tablet, Refills: 3      Multiple Vitamin (MULTI-VITAMIN DAILY) TABS Take 1 tablet by mouth daily      Cyanocobalamin (VITAMIN B12 PO) Take 1 tablet by mouth daily      KRILL OIL PO Take 1 capsule by mouth daily      Cholecalciferol (VITAMIN D PO) Take 4,000 Units by mouth daily      Omega-3 Fatty Acids (OMEGA 3 PO) Take 2 capsules by mouth daily           ALLERGIES     has No Known Allergies. FAMILY HISTORY     He indicated that his mother is alive. He indicated that the status of his father is unknown. He indicated that his brother is alive. He indicated that the status of his maternal grandmother is unknown. He indicated that the status of his maternal grandfather is unknown. He indicated that the status of his paternal grandmother is unknown. He indicated that the status of his paternal grandfather is unknown. He indicated that his daughter is alive. SOCIAL HISTORY       Social History     Tobacco Use    Smoking status: Former Smoker     Packs/day: 0.50     Types: Cigarettes     Start date:      Last attempt to quit: 2012     Years since quittin.6    Smokeless tobacco: Never Used   Substance Use Topics    Alcohol use: No    Drug use: Yes     Types: Marijuana     Comment: stop in      PHYSICAL EXAM     INITIAL VITALS: /74   Pulse 78   Temp 98.8 °F (37.1 °C) (Oral)   Resp 16   Ht 5' 7\" (1.702 m)   Wt 175 lb (79.4 kg)   SpO2 96%   BMI 27.41 kg/m²    Physical Exam  HENT:      Head: Normocephalic.       Right Ear: External ear normal.      Left Ear: External ear normal.      Nose: Nose normal.   Eyes:      Conjunctiva/sclera: Conjunctivae normal.   Cardiovascular:      Rate and Rhythm: Normal rate and regular rhythm. Pulses: Normal pulses. Heart sounds: No murmur. No gallop. Pulmonary:      Effort: Pulmonary effort is normal. No respiratory distress. Breath sounds: No rhonchi or rales. Abdominal:      General: Abdomen is flat. Skin:     General: Skin is dry. Neurological:      Mental Status: He is alert. Mental status is at baseline. Psychiatric:         Mood and Affect: Mood normal.         Behavior: Behavior normal.         MEDICAL DECISION MAKING:       ED Course as of Aug 20 0608   Thu Aug 20, 2020   0232 I spoke with Dr. Daniel Rosales who accepts patient for admission to hospital.    [RENZO]      ED Course User Index  [RENZO] Tejal Garner MD           DIAGNOSTIC RESULTS   EKG:All EKG's are interpreted by the Emergency Department Physician who either signs or Co-signs this chart in the absence of a cardiologist.        RADIOLOGY:All plain film, CT, MRI, and formal ultrasound images (except ED bedside ultrasound) are read by the radiologist, see reports below, unless otherwisenoted in MDM or here. CT CHEST PULMONARY EMBOLISM W CONTRAST   Final Result   No evidence of pulmonary embolism to the segmental level. Dependent opacities suggestive atelectasis versus pulmonary edema. Please   correlate exam findings. XR CHEST PORTABLE   Final Result   Right infrahilar opacity, concerning for pneumonia. Imaging follow-up to   resolution is recommended. LABS: All lab results were reviewed by myself, and all abnormals are listed below.   Labs Reviewed   TROPONIN - Abnormal; Notable for the following components:       Result Value    Troponin, High Sensitivity 517 (*)     All other components within normal limits   TROPONIN - Abnormal; Notable for the following components:    Troponin, High Sensitivity 522 (*)     All other components within normal limits   CBC WITH AUTO DIFFERENTIAL - Abnormal; Notable for the following components:    WBC 11.8 (*)     Hematocrit 39.8 (*)     All other components within normal limits   COMPREHENSIVE METABOLIC PANEL W/ REFLEX TO MG FOR LOW K - Abnormal; Notable for the following components:    Bun/Cre Ratio 26 (*)     AST 77 (*)     Total Bilirubin 1.72 (*)     All other components within normal limits   BRAIN NATRIURETIC PEPTIDE - Abnormal; Notable for the following components:    Pro- (*)     All other components within normal limits   TROPONIN - Abnormal; Notable for the following components:    Troponin, High Sensitivity 468 (*)     All other components within normal limits   COMPREHENSIVE METABOLIC PANEL - Abnormal; Notable for the following components:    CREATININE 0.68 (*)     Bun/Cre Ratio 24 (*)     AST 71 (*)     Total Bilirubin 1.88 (*)     All other components within normal limits   CBC WITH AUTO DIFFERENTIAL - Abnormal; Notable for the following components:    Hemoglobin 12.9 (*)     Hematocrit 38.6 (*)     Immature Granulocytes 1 (*)     All other components within normal limits   TROPONIN   PROTIME-INR   HEPARIN LEVEL/ANTI-XA   HEPARIN LEVEL/ANTI-XA       EMERGENCY DEPARTMENTCOURSE:   Patient did well in the ED. First troponin 517, repeat troponin 522. Troponins elevated however within patient's normal range since cardiac catheterization. BNP is 662. CTA negative for PE. Will admit for observation for upturning troponin and recent cardiac intervention.       Vitals:    Vitals:    08/20/20 0142 08/20/20 0228 08/20/20 0300 08/20/20 0400   BP: 118/73 129/79 133/74    Pulse: 71 73 78 78   Resp: 11 17 16    Temp:   98.8 °F (37.1 °C)    TempSrc:   Oral    SpO2: 96% 94% 96%    Weight:       Height:           The patient was given the following medications while in the emergency department:  Orders Placed This Encounter   Medications    0.9 % sodium

## 2020-08-21 VITALS
TEMPERATURE: 98.1 F | HEIGHT: 67 IN | HEART RATE: 66 BPM | BODY MASS INDEX: 27.47 KG/M2 | OXYGEN SATURATION: 97 % | DIASTOLIC BLOOD PRESSURE: 72 MMHG | WEIGHT: 175 LBS | SYSTOLIC BLOOD PRESSURE: 114 MMHG | RESPIRATION RATE: 16 BRPM

## 2020-08-21 LAB
ANION GAP SERPL CALCULATED.3IONS-SCNC: 12 MMOL/L (ref 9–17)
ANTI-XA UNFRAC HEPARIN: 0.34 IU/L (ref 0.3–0.7)
BUN BLDV-MCNC: 16 MG/DL (ref 6–20)
BUN/CREAT BLD: 22 (ref 9–20)
CALCIUM SERPL-MCNC: 9.2 MG/DL (ref 8.6–10.4)
CHLORIDE BLD-SCNC: 107 MMOL/L (ref 98–107)
CO2: 24 MMOL/L (ref 20–31)
CREAT SERPL-MCNC: 0.72 MG/DL (ref 0.7–1.2)
GFR AFRICAN AMERICAN: >60 ML/MIN
GFR NON-AFRICAN AMERICAN: >60 ML/MIN
GFR SERPL CREATININE-BSD FRML MDRD: ABNORMAL ML/MIN/{1.73_M2}
GFR SERPL CREATININE-BSD FRML MDRD: ABNORMAL ML/MIN/{1.73_M2}
GLUCOSE BLD-MCNC: 99 MG/DL (ref 70–99)
HCT VFR BLD CALC: 40.9 % (ref 40.7–50.3)
HEMOGLOBIN: 13.5 G/DL (ref 13–17)
MCH RBC QN AUTO: 29.9 PG (ref 25.2–33.5)
MCHC RBC AUTO-ENTMCNC: 33 G/DL (ref 28.4–34.8)
MCV RBC AUTO: 90.7 FL (ref 82.6–102.9)
NRBC AUTOMATED: 0 PER 100 WBC
PDW BLD-RTO: 12.5 % (ref 11.8–14.4)
PLATELET # BLD: 221 K/UL (ref 138–453)
PMV BLD AUTO: 10 FL (ref 8.1–13.5)
POTASSIUM SERPL-SCNC: 3.9 MMOL/L (ref 3.7–5.3)
RBC # BLD: 4.51 M/UL (ref 4.21–5.77)
SODIUM BLD-SCNC: 143 MMOL/L (ref 135–144)
TROPONIN INTERP: ABNORMAL
TROPONIN T: ABNORMAL NG/ML
TROPONIN, HIGH SENSITIVITY: 349 NG/L (ref 0–22)
WBC # BLD: 8.1 K/UL (ref 3.5–11.3)

## 2020-08-21 PROCEDURE — 80048 BASIC METABOLIC PNL TOTAL CA: CPT

## 2020-08-21 PROCEDURE — 6360000002 HC RX W HCPCS: Performed by: INTERNAL MEDICINE

## 2020-08-21 PROCEDURE — 85520 HEPARIN ASSAY: CPT

## 2020-08-21 PROCEDURE — 36415 COLL VENOUS BLD VENIPUNCTURE: CPT

## 2020-08-21 PROCEDURE — 84484 ASSAY OF TROPONIN QUANT: CPT

## 2020-08-21 PROCEDURE — 6370000000 HC RX 637 (ALT 250 FOR IP): Performed by: FAMILY MEDICINE

## 2020-08-21 PROCEDURE — 85027 COMPLETE CBC AUTOMATED: CPT

## 2020-08-21 PROCEDURE — 93308 TTE F-UP OR LMTD: CPT

## 2020-08-21 RX ORDER — HEPARIN SODIUM 5000 [USP'U]/ML
4000 INJECTION, SOLUTION INTRAVENOUS; SUBCUTANEOUS PRN
Status: DISCONTINUED | OUTPATIENT
Start: 2020-08-21 | End: 2020-08-21 | Stop reason: HOSPADM

## 2020-08-21 RX ORDER — HEPARIN SODIUM 10000 [USP'U]/100ML
12 INJECTION, SOLUTION INTRAVENOUS CONTINUOUS
Status: DISCONTINUED | OUTPATIENT
Start: 2020-08-21 | End: 2020-08-21

## 2020-08-21 RX ORDER — HEPARIN SODIUM 5000 [USP'U]/ML
2000 INJECTION, SOLUTION INTRAVENOUS; SUBCUTANEOUS PRN
Status: DISCONTINUED | OUTPATIENT
Start: 2020-08-21 | End: 2020-08-21 | Stop reason: HOSPADM

## 2020-08-21 RX ADMIN — FAMOTIDINE 20 MG: 20 TABLET, FILM COATED ORAL at 10:10

## 2020-08-21 RX ADMIN — LISINOPRIL 2.5 MG: 2.5 TABLET ORAL at 10:10

## 2020-08-21 RX ADMIN — HEPARIN SODIUM 12 UNITS/KG/HR: 10000 INJECTION, SOLUTION INTRAVENOUS at 08:19

## 2020-08-21 RX ADMIN — SPIRONOLACTONE 25 MG: 25 TABLET ORAL at 10:09

## 2020-08-21 RX ADMIN — CARVEDILOL 12.5 MG: 12.5 TABLET, FILM COATED ORAL at 10:09

## 2020-08-21 RX ADMIN — TICAGRELOR 90 MG: 90 TABLET ORAL at 10:14

## 2020-08-21 RX ADMIN — ASPIRIN 81 MG: 81 TABLET, COATED ORAL at 10:09

## 2020-08-21 RX ADMIN — HEPARIN SODIUM 18 UNITS/KG/HR: 10000 INJECTION, SOLUTION INTRAVENOUS at 00:15

## 2020-08-21 NOTE — DISCHARGE INSTR - DIET

## 2020-08-21 NOTE — PLAN OF CARE
Problem: Pain:  Goal: Pain level will decrease  Description: Pain level will decrease  Outcome: Ongoing  Goal: Control of acute pain  Description: Control of acute pain  Outcome: Ongoing  Note: Patient has no complaints of chest pain. Cardiology consulted.  Patient has Heparin gtt

## 2020-08-21 NOTE — PROGRESS NOTES
[address] Hospitalist        2020   7:54 AM    Name:  Karina Ha  MRN:    [id number]     Acct:     [de-identified]   Room:  [address] [place] [address] Day: 1     Admit Date: 2020 11:37 PM  PCP: Kevin Rae DO    C/C:   Chief Complaint   Patient presents with    Chest Pain     chest pain with deep breath that began tonight. hx stents 4 days ago       Assessment:      · Chest pain  · Recent coronary stent placement   · Coronary artery disease   · Mixed hyperlipidemia  · Gastroesophageal reflux disease   · Osteoarthritis multiple joints  · H/o CA Prostate  · Past smoker  · THC abuse  · Vapor assisted device use for inhalation         Plan:        · Admit to Progressive  · Monitor vitals closely  · Keep SpO2 above 90%  · Pain control  · MANDO  · BB/ACE/ Statin  · IV fluids  · Serial cardiac enzymes  · Serial EKG  · Cardiology consulted in ER  · Resume ASA  Resume Atorvastatin  · Resume Coreg  · Famotidine  · Resume Lisinopril  · Resume Spironolactone  · Resume Brilinta  · Cardiology started Heparin gtt  · Check CBC, BMP  · LFTs  · Diet cardiac  · Incentive spirometry   · DVT and GI prophylaxis  · Heparin gtt DC d by cardiology  · DC planning once [place] w Cardiology      Subjective:     Patient seen and examined at bedside. No overnight events. No acute complaints today. Afebrile  Pt. Denies any CP, SOB, palpitation, HA, dizziness, chills, cough, cold, changes in urination, BM or skin changes or any pain. ROS:  A 10 point system reviewed and negative otherwise mentioned above.         Physical Examination:      Vitals:  /65   Pulse 81   Temp 98.1 °F (36.7 °C) (Oral)   Resp 18   Ht 5' 7\" (1.702 m)   Wt 175 lb (79.4 kg)   SpO2 100%   BMI 27.41 kg/m²   Temp (24hrs), Av.3 °F (36.8 °C), Min:98.1 °F (36.7 °C), Max:99 °F (37.2 °C)    Weight:   Wt Readings from Last 3 Encounters:   20 175 lb (79.4 kg)   08/15/20 180 lb (81.6 kg)   18 196 lb 6.9 oz (89.1 kg)     I/O MD, 2.5 mg at 08/20/20 0859    spironolactone (ALDACTONE) tablet 25 mg, 25 mg, Oral, Daily, Seamus Armendariz MD, 25 mg at 08/20/20 0859    ticagrelor (BRILINTA) tablet 90 mg, 90 mg, Oral, BID, Seamus Armendariz MD, 90 mg at 08/20/20 1950    heparin (porcine) injection 6,350 Units, 80 Units/kg, Intravenous, PRN, Martina S Thierno, DO    heparin (porcine) injection 3,200 Units, 40 Units/kg, Intravenous, PRN, Martina S Thierno, DO    heparin 25,000 units in dextrose 5% 250 mL infusion, 18 Units/kg/hr, Intravenous, Continuous, Martina S Thierno, DO, Last Rate: 14.3 mL/hr at 08/21/20 0015, 18 Units/kg/hr at 08/21/20 0015      I/O (24Hr):     Intake/Output Summary (Last 24 hours) at 8/21/2020 0754  Last data filed at 8/20/2020 1800  Gross per 24 hour   Intake 1100 ml   Output --   Net 1100 ml       Data:           Labs:    Hematology:  Recent Labs     08/19/20 2350 08/20/20  0426 08/20/20  0613 08/21/20  0634   WBC 11.8* 10.7  --  8.1   RBC 4.48 4.31  --  4.51   HGB 13.5 12.9*  --  13.5   HCT 39.8* 38.6*  --  40.9   MCV 88.8 89.6  --  90.7   MCH 30.1 29.9  --  29.9   MCHC 33.9 33.4  --  33.0   RDW 12.2 12.2  --  12.5    245  --  221   MPV 10.5 9.9  --  10.0   INR  --   --  1.2  --      Chemistry:  Recent Labs     08/19/20  2350 08/20/20  0145 08/20/20  0426 08/20/20  1156 08/21/20  0634     --  135  --  143   K 4.1  --  3.8  --  3.9   CL 98  --  99  --  107   CO2 24  --  22  --  24   GLUCOSE 95  --  92  --  99   BUN 19  --  16  --  16   CREATININE 0.74  --  0.68*  --  0.72   ANIONGAP 13  --  14  --  12   LABGLOM >60  --  >60  --  >60   GFRAA >60  --  >60  --  >60   CALCIUM 10.0  --  9.6  --  9.2   PROBNP 662*  --   --   --   --    TROPHS 517* 522* 468* 392*  --      Recent Labs     08/19/20  2350 08/20/20  0426 08/20/20  1156   PROT 7.6 7.1 6.8   LABALBU 4.6 4.2 4.0   AST 77* 71* 59*   ALT 36 34 32   ALKPHOS 76 70 69   BILITOT 1.72* 1.88* 1.73*   BILIDIR  --   --  0.28       Lab Results   Component Value Date/Time SPECIAL NOT REPORTED 05/23/2018 10:52 AM     Lab Results   Component Value Date/Time    CULTURE NO GROWTH 05/23/2018 10:52 AM    CULTURE  05/23/2018 10:52 AM     [employer] [address] [facility], [address] [place] [address] [phone]       No results found for: POCPH, PHART, PH, POCPCO2, QIF6RCS, PCO2, POCPO2, PO2ART, PO2, POCHCO3, HJQ7IJE, HCO3, NBEA, PBEA, BEART, BE, THGBART, THB, TVB8DTU, VIEN3BRM, X0GRGVCU, O2SAT, FIO2    Radiology:    Xr Chest Portable    Result Date: 8/20/2020  Right infrahilar opacity, concerning for pneumonia. Imaging follow-up to resolution is recommended. Xr Chest Portable    Result Date: 8/15/2020  Mild vascular congestion. No focal consolidation is seen to suggest pneumonia. Ct Chest Pulmonary Embolism W Contrast    Result Date: 8/20/2020  No evidence of pulmonary embolism to the segmental level. Dependent opacities suggestive atelectasis versus pulmonary edema. Please correlate exam findings. All radiological studies reviewed  Code Status:  Full Code        Electronically signed by Roxy Keen MD on 8/21/2020 at 7:54 AM    This note was created with the assistance of a speech-recognition program.  Although the intention is to generate a document that actually reflects the content of the visit, no guarantees can be provided that every mistake has been identified and corrected by editing. Note was updated later by me after  physical examination and  completion of the assessment.

## 2020-08-21 NOTE — CONSULTS
[facility]  In Patient Cardiology Consult             Date:   8/21/2020  Patient name: Louisa Braun  Date of admission:  8/19/2020 11:37 PM  MRN:   [id number]  YOB: 1965    Reason for Admission: Atypical chest pain    CHIEF COMPLAINT: Atypical chest pain    History Obtained From: Patient and chart    HISTORY OF PRESENT ILLNESS:    This is a 42-year-old male who presented due to 1 episode of chest pain. He states that he was at home. He had one episode of right-sided chest pain worse with exhalation. Given his recent MI he was told to pursue any episodes of chest pains. So he presented to the emergency room. In the emergency room his troponins were elevated. However they were downtrending compared to where they were previously. He was admitted for further cardiac work-up. He has remained on a heparin drip. He denies any chest pains whatsoever including chest pain with exhalation. Known to us from prior admission 8/17/20:  Assessment / Acute Cardiac Problems:   Anterior STEMI  Ischemic cardiomyopathy  H/o smoking now vaping     Plan of Treatment:   Continue ASA and brilinta  Continue statin  TTE pending  Increase core 12.5 mg po BID  Continue ACE  Add aldactone 25mg po qday  Will need cardiac rehab  If LVEF < 35% on TTE, will need lifevest prior to discharge  Nutrition consult  CHF education      Past Medical History:   has a past medical history of Prostate CA ([address] PSA elevation, RBBB, Snores, and Wears glasses. Past Surgical History:   has a past surgical history that includes Knee arthroscopy (Right, 1991); Prostate Biopsy (03/2018); knee surgery (Right); Prostatectomy (05/23/2018); and pr lap,prostatectomy,radical,w/nerve spare,incl robotic (N/A, 5/23/2018). Home Medications:    Prior to Admission medications    Medication Sig Start Date End Date Taking?  Authorizing Provider   aspirin 81 MG EC tablet Take 1 tablet by mouth daily 8/18/20   Dewayne Lockett MD atorvastatin (LIPITOR) 80 MG tablet Take 1 tablet by mouth nightly 8/17/20   Surekha Kitchen MD   carvedilol (COREG) 12.5 MG tablet Take 1 tablet by mouth 2 times daily (with meals) 8/17/20   Surekha Kitchen MD   famotidine (PEPCID) 20 MG tablet Take 1 tablet by mouth 2 times daily 8/17/20   Surekha Kitchen MD   lisinopril (PRINIVIL;ZESTRIL) 2.5 MG tablet Take 1 tablet by mouth daily 8/18/20   Surekha Kitchen MD   spironolactone (ALDACTONE) 25 MG tablet Take 1 tablet by mouth daily 8/18/20   Surekha Kitchen MD   ticagrelor (BRILINTA) 90 MG TABS tablet Take 1 tablet by mouth 2 times daily 8/17/20   Surekha Kitchen MD   Multiple Vitamin (MULTI-VITAMIN DAILY) TABS Take 1 tablet by mouth daily    Historical Provider, MD   Cyanocobalamin (VITAMIN B12 PO) Take 1 tablet by mouth daily    Historical Provider, MD   KRILL OIL PO Take 1 capsule by mouth daily    Historical Provider, MD   Cholecalciferol (VITAMIN D PO) Take 4,000 Units by mouth daily    Historical Provider, MD   Omega-3 Fatty Acids (OMEGA 3 PO) Take 2 capsules by mouth daily    Historical Provider, MD       Allergies:  Patient has no known allergies. Social History:   reports that he quit smoking about 8 years ago. His smoking use included cigarettes. He started smoking about 43 years ago. He smoked 0.50 packs per day. He has never used smokeless tobacco. He reports current drug use. Drug: Marijuana. He reports that he does not drink alcohol. Family History:   Neg for early CAD    REVIEW OF SYSTEMS:    · Constitutional: there has been no unanticipated weight loss. There's been No change in energy level, No change in activity level. · Eyes: No visual changes or diplopia. No scleral icterus. · ENT: No Headaches, hearing loss or vertigo. No mouth sores or sore throat. · Cardiovascular: As HPI  · Respiratory: As HPI  · Gastrointestinal: No abdominal pain, appetite loss, blood in stools.  No change in bowel or bladder Ref Range    Ventricular Rate 81 BPM    Atrial Rate 81 BPM    P-R Interval 122 ms    QRS Duration 116 ms    Q-T Interval 378 ms    QTc Calculation (Bazett) 439 ms    P Axis 66 degrees    R Axis 122 degrees    T Axis 65 degrees    Narrative    Normal sinus rhythm  Right bundle branch block  Abnormal ECG  When compared with ECG of 15-AUG-2020 19:27,  Right bundle branch block is now Present       Echo:  Results for orders placed or performed during the hospital encounter of 08/15/20   ECHO Complete 2D W Doppler W Color  Left ventricle is normal in size. Mild left ventricular hypertrophy. Global left ventricular systolic function is mildly reduced with an  estimated ejection fraction of 40 %. Large apical hypokinesis noted. Mild to moderate mitral regurgitation. No significant pericardial effusion is seen. LAST CATH:   Results for orders placed or performed during the hospital encounter of 08/15/20   Cardiac Catheterization   Anterior STEMI. Moderate LV systolic dysfunctionl LVEF 35%. Successful PTCA/GINI of distal and mid LAD. Successful PTCA/GINI of mid D1. Recommendations      Medical therapy as needed. Risk factor modification. Routine Post Stent Orders. TTE. If LVEF is < 35%, will need LifeVest.         Labs:     CBC:   Recent Labs     08/20/20  0426 08/21/20  0634   WBC 10.7 8.1   HGB 12.9* 13.5   HCT 38.6* 40.9    221     BMP:   Recent Labs     08/20/20  0426 08/21/20  0634    143   K 3.8 3.9   CO2 22 24   BUN 16 16   CREATININE 0.68* 0.72   LABGLOM >60 >60   GLUCOSE 92 99     BNP: No results for input(s): BNP in the last 72 hours. PT/INR:   Recent Labs     08/20/20  0613   PROTIME 15.4*   INR 1.2     APTT:No results for input(s): APTT in the last 72 hours.   CARDIAC ENZYMES:  Recent Labs     08/20/20  0426 08/20/20  1156 08/21/20  0634   TROPHS 468* 392* 349*     FASTING LIPID PANEL:  Lab Results   Component Value Date    HDL 43 08/15/2020    TRIG 63 08/15/2020     LIVER PROFILE:  Recent Labs     08/20/20  0426 08/20/20  1156   AST 71* 59*   ALT 34 32   LABALBU 4.2 4.0         IMPRESSION:    Patient Active Problem List   Diagnosis    Prostate cancer ([address]    STEMI (ST elevation myocardial infarction) ([address]    Chest pain     - Non cardiac chest pain- most likely pleuritic, right sided, only with exhalation- resolved. Pain is very different than prior to recent PCI. - Elevated troponin but not likely acute NSTEMI- they are elevated due to prior MI and PCI- in fact continue to downtrending  - HTN  - DL  - Ischemic CM, Chronic Sys HF, HFrEF- LVEF 40% on Echo 8/20 with Large apical hypokinesis noted. Mild to moderate mitral regurgitation  - h/o Vaping  - h/o Marijuana use    RECOMMENDATIONS:  - troponins continue to down trend as expected post MI/PCI  - on heparin drip, awaiting echo  - continue all current meds including DAPT  - will check 2d Echo limited for LVEF and effusion  - if LVEF is stable 40% or more and if no effusion, no further in patient cardiac work up planned  - can follow up in 2 weeks. - discussed importance of marijuana cessation/vapin cessation and medication compliance. Discussed with patient and nursing. Thank you for allowing me to participate in the care of this patient, please do not hesitate to call if you have any questions. Derek Olvera DO, [address] [facility]  [facility]. [facility]  [phone]

## 2020-08-24 NOTE — DISCHARGE SUMMARY
[address] [place] [address].,    Adult Hospitalist      Patient ID: Regi Robbins  MRN: [id number]     Acct:  [de-identified]       Patient's PCP: Bea Diaz DO    Admit Date: 8/19/2020     Discharge Date: 8/21/2020      Admitting Physician: Tamiko Espino MD    Discharge Physician: Tamiko Espino MD     CONSULTANTS: Patient Care Team:  Bea Diaz DO as PCP - General (Family Medicine)  Bea Diaz DO as PCP - [facility]    PROCEDURES PERFORMED:     Active Discharge Diagnoses:  · Chest pain  · Recent coronary stent placement   · Coronary artery disease   · Mixed hyperlipidemia  · Gastroesophageal reflux disease   · Osteoarthritis multiple joints  · H/o CA Prostate  · Past smoker  · THC abuse  · Vapor assisted device use for inhalation       Primary Problem  <principal problem not specified>    Hospital Course: Pt had an MI recently and underwent coronary artery stent placement. Pt c/o an episode of chest pain and elevated troponin after which he was admitted to the hospital and maintained on IV heparin gtt. Cardiology were consulted. Troponin showed a downward trend and heparin was later discontinued. Pt has stopped smoking cigarettes. Pt was counseled not to Vape either. The plan was discussed in detail with patient who agreed with the plan and verbalized understanding . The patient was seen and examined on day of discharge and this discharge summary is in conjunction with any daily progress note from day of discharge.     Hospital Data:    Labs:    Hematology:  Recent Labs     08/21/20  0634   WBC 8.1   RBC 4.51   HGB 13.5   HCT 40.9   MCV 90.7   MCH 29.9   MCHC 33.0   RDW 12.5      MPV 10.0     Chemistry:  Recent Labs     08/21/20  0634      K 3.9      CO2 24   GLUCOSE 99   BUN 16   CREATININE 0.72   ANIONGAP 12   LABGLOM >60   GFRAA >60   CALCIUM 9.2   TROPHS 349*     No results for input(s): PROT, LABALBU, LABA1C, H4QPNZV, Q5SGISN, FT4, TSH, AST, ALT, LDH, GGT, ALKPHOS, LABGGT, BILITOT, BILIDIR, AMMONIA, AMYLASE, LIPASE, LACTATE, CHOL, HDL, LDLCHOLESTEROL, CHOLHDLRATIO, TRIG, VLDL, FLI95LI, PHENYTOIN, PHENYF, URICACID, POCGLU in the last 72 hours. Lab Results   Component Value Date    INR 1.2 08/20/2020    INR 1.2 08/16/2020    INR 1.0 08/15/2020    PROTIME 15.4 (H) 08/20/2020    PROTIME 14.9 (H) 08/16/2020    PROTIME 13.1 08/15/2020     Lab Results   Component Value Date/Time    SPECIAL NOT REPORTED 05/23/2018 10:52 AM     Lab Results   Component Value Date/Time    CULTURE NO GROWTH 05/23/2018 10:52 AM    CULTURE  05/23/2018 10:52 AM     [employer] [address] [facility], [address] [place] [address] [phone]       No results found for: POCPH, PHART, PH, POCPCO2, YAU5ITM, PCO2, POCPO2, PO2ART, PO2, POCHCO3, BMA5XAT, HCO3, NBEA, PBEA, BEART, BE, THGBART, THB, TOG9GOS, UZOK1CII, Z2QHBMMX, O2SAT, FIO2    Radiology:    Xr Chest Portable    Result Date: 8/20/2020  Right infrahilar opacity, concerning for pneumonia. Imaging follow-up to resolution is recommended. Ct Chest Pulmonary Embolism W Contrast    Result Date: 8/20/2020  No evidence of pulmonary embolism to the segmental level. Dependent opacities suggestive atelectasis versus pulmonary edema. Please correlate exam findings. All radiological studies reviewed      Reviews of Symptoms:    A 10 point system is reviewed and  negative except described in hospital course    Physical Exam:    Vitals:  /72   Pulse 66   Temp 98.1 °F (36.7 °C) (Oral)   Resp 16   Ht 5' 7\" (1.702 m)   Wt 175 lb (79.4 kg)   SpO2 97%   BMI 27.41 kg/m²   No data recorded.       General appearance - alert, well appearing, and in no acute distress  Mental status - oriented to person, place, and time with normal affect  Head - normocephalic and atraumatic  Eyes - pupils equal and reactive, extraocular eye movements intact, conjunctiva clear  Ears - hearing appears to be intact  Nose - no drainage noted  Mouth - mucous membranes moist  Neck - supple, no carotid bruits, thyroid not palpable  Chest - clear to auscultation, normal effort  Heart - normal rate, regular rhythm, no murmur  Abdomen - soft, nontender, nondistended, bowel sounds present all four quadrants, no masses, hepatomegaly or splenomegaly  Neurological - normal speech, no focal findings or movement disorder noted, cranial nerves II through XII grossly intact  Extremities - peripheral pulses palpable, no pedal edema or calf pain with palpation  Skin - no gross lesions, rashes, or induration noted      Consults:  IP CONSULT TO INTERNAL MEDICINE  IP CONSULT TO CARDIOLOGY    Disposition: Home    Discharged Condition: Stable    Follow Up: DO Katie Fox[address] [address] [phone]    In 2 weeks  follow up    [address] [place] [address]  [phone]    In 2 weeks  follow up            Diet: No diet orders on file    Discharge Medications:    [facility] Medication Instructions TCL:[id number]    Printed on:08/23/20 9585   Medication Information                      aspirin 81 MG EC tablet  Take 1 tablet by mouth daily             atorvastatin (LIPITOR) 80 MG tablet  Take 1 tablet by mouth nightly             carvedilol (COREG) 12.5 MG tablet  Take 1 tablet by mouth 2 times daily (with meals)             Cholecalciferol (VITAMIN D PO)  Take 4,000 Units by mouth daily             Cyanocobalamin (VITAMIN B12 PO)  Take 1 tablet by mouth daily             famotidine (PEPCID) 20 MG tablet  Take 1 tablet by mouth 2 times daily             lisinopril (PRINIVIL;ZESTRIL) 2.5 MG tablet  Take 1 tablet by mouth daily             Multiple Vitamin (MULTI-VITAMIN DAILY) TABS  Take 1 tablet by mouth daily             Omega-3 Fatty Acids (OMEGA 3 PO)  Take 2 capsules by mouth daily             spironolactone (ALDACTONE) 25 MG tablet  Take 1 tablet by mouth daily             ticagrelor (BRILINTA) 90 MG TABS tablet  Take 1 tablet by mouth 2 times daily                 Code Status:  Prior    Time Spent on discharge is  30 mins in patient examination, evaluation, counseling as well as medication reconciliation, prescriptions for required medications, discharge plan and follow up. Electronically signed by Rosetta Sherman MD on 8/23/2020 at 10:50 PM     Thank you Dr. Kevin Rae DO for the opportunity to be involved in this patient's care. This note was created with the assistance of a speech-recognition program.  Although the intention is to generate a document that actually reflects the content of the visit, no guarantees can be provided that every mistake has been identified and corrected by editing. Note was updated later by me after  physical examination and  completion of the assessment.

## 2020-09-04 ENCOUNTER — HOSPITAL ENCOUNTER (OUTPATIENT)
Dept: CARDIAC REHAB | Age: 55
Setting detail: THERAPIES SERIES
Discharge: HOME | End: 2020-09-04
Payer: COMMERCIAL

## 2020-09-04 VITALS — BODY MASS INDEX: 27.29 KG/M2 | WEIGHT: 173.9 LBS | HEIGHT: 67 IN

## 2020-09-04 PROCEDURE — 93798 PHYS/QHP OP CAR RHAB W/ECG: CPT

## 2020-09-09 ENCOUNTER — HOSPITAL ENCOUNTER (OUTPATIENT)
Dept: CARDIAC REHAB | Age: 55
Setting detail: THERAPIES SERIES
End: 2020-09-09
Payer: COMMERCIAL

## 2020-09-11 ENCOUNTER — HOSPITAL ENCOUNTER (OUTPATIENT)
Dept: CARDIAC REHAB | Age: 55
Setting detail: THERAPIES SERIES
Discharge: HOME | End: 2020-09-11
Payer: COMMERCIAL

## 2020-09-11 VITALS — WEIGHT: 172 LBS | BODY MASS INDEX: 26.94 KG/M2 | TEMPERATURE: 96.7 F

## 2020-09-11 PROCEDURE — 93798 PHYS/QHP OP CAR RHAB W/ECG: CPT

## 2020-09-14 ENCOUNTER — HOSPITAL ENCOUNTER (OUTPATIENT)
Dept: CARDIAC REHAB | Age: 55
Setting detail: THERAPIES SERIES
Discharge: HOME | End: 2020-09-14
Payer: COMMERCIAL

## 2020-09-14 VITALS — TEMPERATURE: 96.5 F | WEIGHT: 175.7 LBS | BODY MASS INDEX: 27.52 KG/M2

## 2020-09-14 PROCEDURE — 93798 PHYS/QHP OP CAR RHAB W/ECG: CPT

## 2020-09-16 ENCOUNTER — HOSPITAL ENCOUNTER (OUTPATIENT)
Dept: CARDIAC REHAB | Age: 55
Setting detail: THERAPIES SERIES
Discharge: HOME | End: 2020-09-16
Payer: COMMERCIAL

## 2020-09-16 VITALS — BODY MASS INDEX: 26.99 KG/M2 | WEIGHT: 172.3 LBS | TEMPERATURE: 97 F

## 2020-09-16 PROCEDURE — 93798 PHYS/QHP OP CAR RHAB W/ECG: CPT

## 2020-09-16 NOTE — PROGRESS NOTES
RPE reinforced, handout provided.  Goals reviewed, pt states continues to work on heart health goal.

## 2020-09-18 ENCOUNTER — HOSPITAL ENCOUNTER (OUTPATIENT)
Dept: CARDIAC REHAB | Age: 55
Setting detail: THERAPIES SERIES
Discharge: HOME | End: 2020-09-18
Payer: COMMERCIAL

## 2020-09-18 VITALS — BODY MASS INDEX: 26.94 KG/M2 | TEMPERATURE: 96.6 F | WEIGHT: 172 LBS

## 2020-09-18 PROCEDURE — 93798 PHYS/QHP OP CAR RHAB W/ECG: CPT

## 2020-09-21 ENCOUNTER — HOSPITAL ENCOUNTER (OUTPATIENT)
Dept: CARDIAC REHAB | Age: 55
Setting detail: THERAPIES SERIES
Discharge: HOME | End: 2020-09-21
Payer: COMMERCIAL

## 2020-09-23 ENCOUNTER — HOSPITAL ENCOUNTER (OUTPATIENT)
Dept: CARDIAC REHAB | Age: 55
Setting detail: THERAPIES SERIES
Discharge: HOME | End: 2020-09-23
Payer: COMMERCIAL

## 2020-09-23 VITALS — BODY MASS INDEX: 27.1 KG/M2 | WEIGHT: 173 LBS | TEMPERATURE: 96.9 F

## 2020-09-23 PROCEDURE — 93798 PHYS/QHP OP CAR RHAB W/ECG: CPT

## 2020-09-25 ENCOUNTER — HOSPITAL ENCOUNTER (OUTPATIENT)
Dept: CARDIAC REHAB | Age: 55
Setting detail: THERAPIES SERIES
Discharge: HOME | End: 2020-09-25
Payer: COMMERCIAL

## 2020-09-25 VITALS — TEMPERATURE: 96.6 F | WEIGHT: 172 LBS | BODY MASS INDEX: 26.94 KG/M2

## 2020-09-25 PROCEDURE — 93798 PHYS/QHP OP CAR RHAB W/ECG: CPT

## 2020-09-25 NOTE — PROGRESS NOTES
Goals reviewed with patient today as increasing exercise time spent on legs. Patient says he is continuing to work to increase cardiac exercise on his days off of rehab.

## 2020-09-28 ENCOUNTER — HOSPITAL ENCOUNTER (OUTPATIENT)
Dept: CARDIAC REHAB | Age: 55
Setting detail: THERAPIES SERIES
Discharge: HOME | End: 2020-09-28
Payer: COMMERCIAL

## 2020-09-30 ENCOUNTER — HOSPITAL ENCOUNTER (OUTPATIENT)
Dept: CARDIAC REHAB | Age: 55
Setting detail: THERAPIES SERIES
Discharge: HOME | End: 2020-09-30
Payer: COMMERCIAL

## 2020-09-30 VITALS — WEIGHT: 172 LBS | BODY MASS INDEX: 26.94 KG/M2 | TEMPERATURE: 96.3 F

## 2020-09-30 PROCEDURE — 93798 PHYS/QHP OP CAR RHAB W/ECG: CPT

## 2020-10-02 ENCOUNTER — HOSPITAL ENCOUNTER (OUTPATIENT)
Dept: CARDIAC REHAB | Age: 55
Setting detail: THERAPIES SERIES
Discharge: HOME | End: 2020-10-02
Payer: COMMERCIAL

## 2020-10-02 VITALS — TEMPERATURE: 96.4 F | BODY MASS INDEX: 27.1 KG/M2 | WEIGHT: 173 LBS

## 2020-10-02 PROCEDURE — 93798 PHYS/QHP OP CAR RHAB W/ECG: CPT

## 2020-10-05 ENCOUNTER — HOSPITAL ENCOUNTER (OUTPATIENT)
Dept: CARDIAC REHAB | Age: 55
Setting detail: THERAPIES SERIES
Discharge: HOME | End: 2020-10-05
Payer: COMMERCIAL

## 2020-10-05 VITALS — WEIGHT: 175 LBS | BODY MASS INDEX: 27.47 KG/M2 | HEIGHT: 67 IN | TEMPERATURE: 96.8 F

## 2020-10-05 PROCEDURE — 93798 PHYS/QHP OP CAR RHAB W/ECG: CPT

## 2020-10-07 ENCOUNTER — HOSPITAL ENCOUNTER (OUTPATIENT)
Dept: CARDIAC REHAB | Age: 55
Setting detail: THERAPIES SERIES
Discharge: HOME | End: 2020-10-07
Payer: COMMERCIAL

## 2020-10-07 VITALS — TEMPERATURE: 96.2 F | BODY MASS INDEX: 27.57 KG/M2 | WEIGHT: 176 LBS

## 2020-10-07 PROCEDURE — 93798 PHYS/QHP OP CAR RHAB W/ECG: CPT

## 2020-10-09 ENCOUNTER — HOSPITAL ENCOUNTER (OUTPATIENT)
Dept: CARDIAC REHAB | Age: 55
Setting detail: THERAPIES SERIES
Discharge: HOME | End: 2020-10-09
Payer: COMMERCIAL

## 2020-10-09 VITALS — BODY MASS INDEX: 27.25 KG/M2 | WEIGHT: 174 LBS | TEMPERATURE: 96.8 F

## 2020-10-09 PROCEDURE — 93798 PHYS/QHP OP CAR RHAB W/ECG: CPT

## 2020-10-12 ENCOUNTER — HOSPITAL ENCOUNTER (OUTPATIENT)
Dept: CARDIAC REHAB | Age: 55
Setting detail: THERAPIES SERIES
Discharge: HOME | End: 2020-10-12
Payer: COMMERCIAL

## 2020-10-12 VITALS — BODY MASS INDEX: 27.88 KG/M2 | WEIGHT: 178 LBS | TEMPERATURE: 96.3 F

## 2020-10-12 PROCEDURE — 93798 PHYS/QHP OP CAR RHAB W/ECG: CPT

## 2020-10-12 NOTE — PROGRESS NOTES
Goals reviewed, pt states feeling healthier since starting Cardiac rehab, continues to work on this goal.

## 2020-10-14 ENCOUNTER — HOSPITAL ENCOUNTER (OUTPATIENT)
Dept: CARDIAC REHAB | Age: 55
Setting detail: THERAPIES SERIES
Discharge: HOME | End: 2020-10-14
Payer: COMMERCIAL

## 2020-10-14 VITALS — TEMPERATURE: 97.2 F | BODY MASS INDEX: 27.72 KG/M2 | WEIGHT: 177 LBS

## 2020-10-14 PROCEDURE — 93798 PHYS/QHP OP CAR RHAB W/ECG: CPT

## 2020-10-16 ENCOUNTER — HOSPITAL ENCOUNTER (OUTPATIENT)
Dept: CARDIAC REHAB | Age: 55
Setting detail: THERAPIES SERIES
Discharge: HOME | End: 2020-10-16
Payer: COMMERCIAL

## 2020-10-16 VITALS — BODY MASS INDEX: 27.57 KG/M2 | TEMPERATURE: 96.4 F | WEIGHT: 176 LBS

## 2020-10-16 PROCEDURE — 93798 PHYS/QHP OP CAR RHAB W/ECG: CPT

## 2020-10-19 ENCOUNTER — HOSPITAL ENCOUNTER (OUTPATIENT)
Dept: CARDIAC REHAB | Age: 55
Setting detail: THERAPIES SERIES
Discharge: HOME | End: 2020-10-19
Payer: COMMERCIAL

## 2020-10-19 VITALS — BODY MASS INDEX: 27.88 KG/M2 | WEIGHT: 178 LBS | TEMPERATURE: 96.3 F

## 2020-10-19 PROCEDURE — 93798 PHYS/QHP OP CAR RHAB W/ECG: CPT

## 2020-10-19 NOTE — PROGRESS NOTES
Goals reviewed with patient today as increasing time of exercise spent on arms. Patient states he is feeling some improvement in endurance. Will continue to work to improve.

## 2020-10-21 ENCOUNTER — HOSPITAL ENCOUNTER (OUTPATIENT)
Dept: CARDIAC REHAB | Age: 55
Setting detail: THERAPIES SERIES
Discharge: HOME | End: 2020-10-21
Payer: COMMERCIAL

## 2020-10-21 VITALS — WEIGHT: 178 LBS | TEMPERATURE: 97 F | BODY MASS INDEX: 27.88 KG/M2

## 2020-10-21 PROCEDURE — 93798 PHYS/QHP OP CAR RHAB W/ECG: CPT

## 2020-10-23 ENCOUNTER — HOSPITAL ENCOUNTER (OUTPATIENT)
Dept: CARDIAC REHAB | Age: 55
Setting detail: THERAPIES SERIES
Discharge: HOME | End: 2020-10-23
Payer: COMMERCIAL

## 2020-10-23 VITALS — WEIGHT: 179 LBS | TEMPERATURE: 96.4 F | BODY MASS INDEX: 28.04 KG/M2

## 2020-10-23 PROCEDURE — 93798 PHYS/QHP OP CAR RHAB W/ECG: CPT

## 2020-10-26 ENCOUNTER — HOSPITAL ENCOUNTER (OUTPATIENT)
Dept: CARDIAC REHAB | Age: 55
Setting detail: THERAPIES SERIES
Discharge: HOME | End: 2020-10-26
Payer: COMMERCIAL

## 2020-10-26 VITALS — WEIGHT: 178 LBS | BODY MASS INDEX: 27.88 KG/M2 | TEMPERATURE: 96.6 F

## 2020-10-26 PROCEDURE — 93798 PHYS/QHP OP CAR RHAB W/ECG: CPT

## 2020-10-26 NOTE — PROGRESS NOTES
ITP updated. Diet, home exercise,meds reviewed. Pt notes improved strength since starting Cardiac Rehab.

## 2020-10-28 ENCOUNTER — HOSPITAL ENCOUNTER (OUTPATIENT)
Dept: CARDIAC REHAB | Age: 55
Setting detail: THERAPIES SERIES
Discharge: HOME | End: 2020-10-28
Payer: COMMERCIAL

## 2020-10-28 VITALS — BODY MASS INDEX: 27.88 KG/M2 | WEIGHT: 178 LBS | TEMPERATURE: 96.9 F

## 2020-10-28 PROCEDURE — 93798 PHYS/QHP OP CAR RHAB W/ECG: CPT

## 2020-10-30 ENCOUNTER — HOSPITAL ENCOUNTER (OUTPATIENT)
Dept: CARDIAC REHAB | Age: 55
Setting detail: THERAPIES SERIES
Discharge: HOME | End: 2020-10-30
Payer: COMMERCIAL

## 2020-10-30 VITALS — WEIGHT: 181 LBS | TEMPERATURE: 96.7 F | BODY MASS INDEX: 28.35 KG/M2

## 2020-10-30 PROCEDURE — 93798 PHYS/QHP OP CAR RHAB W/ECG: CPT

## 2020-11-02 ENCOUNTER — HOSPITAL ENCOUNTER (OUTPATIENT)
Dept: CARDIAC REHAB | Age: 55
Setting detail: THERAPIES SERIES
Discharge: HOME | End: 2020-11-02
Payer: COMMERCIAL

## 2020-11-04 ENCOUNTER — HOSPITAL ENCOUNTER (OUTPATIENT)
Dept: CARDIAC REHAB | Age: 55
Setting detail: THERAPIES SERIES
Discharge: HOME | End: 2020-11-04
Payer: COMMERCIAL

## 2020-11-04 VITALS — BODY MASS INDEX: 28.88 KG/M2 | HEIGHT: 67 IN | WEIGHT: 184 LBS | TEMPERATURE: 96.2 F

## 2020-11-04 PROCEDURE — 93798 PHYS/QHP OP CAR RHAB W/ECG: CPT

## 2020-11-04 NOTE — PROGRESS NOTES
Goals reviewed- pt feeling increased energy with exercise and is exercising at home. Pt following cardiac diet, denies s&s of depression, and is complaint with home medications. ITP updated.

## 2020-11-06 ENCOUNTER — HOSPITAL ENCOUNTER (OUTPATIENT)
Dept: CARDIAC REHAB | Age: 55
Setting detail: THERAPIES SERIES
Discharge: HOME | End: 2020-11-06
Payer: COMMERCIAL

## 2020-11-06 VITALS — TEMPERATURE: 96.1 F | WEIGHT: 184 LBS | BODY MASS INDEX: 28.82 KG/M2

## 2020-11-06 PROCEDURE — 93798 PHYS/QHP OP CAR RHAB W/ECG: CPT

## 2020-11-09 ENCOUNTER — HOSPITAL ENCOUNTER (OUTPATIENT)
Dept: CARDIAC REHAB | Age: 55
Setting detail: THERAPIES SERIES
Discharge: HOME | End: 2020-11-09
Payer: COMMERCIAL

## 2020-11-09 VITALS — WEIGHT: 183 LBS | BODY MASS INDEX: 28.66 KG/M2 | TEMPERATURE: 97.2 F

## 2020-11-09 PROCEDURE — 93798 PHYS/QHP OP CAR RHAB W/ECG: CPT

## 2020-11-11 ENCOUNTER — HOSPITAL ENCOUNTER (OUTPATIENT)
Dept: CARDIAC REHAB | Age: 55
Setting detail: THERAPIES SERIES
Discharge: HOME | End: 2020-11-11
Payer: COMMERCIAL

## 2020-11-11 VITALS — TEMPERATURE: 96.5 F | WEIGHT: 184 LBS | BODY MASS INDEX: 28.82 KG/M2

## 2020-11-11 PROCEDURE — 93798 PHYS/QHP OP CAR RHAB W/ECG: CPT

## 2020-11-13 ENCOUNTER — HOSPITAL ENCOUNTER (OUTPATIENT)
Dept: CARDIAC REHAB | Age: 55
Setting detail: THERAPIES SERIES
Discharge: HOME | End: 2020-11-13
Payer: COMMERCIAL

## 2020-11-13 VITALS — WEIGHT: 182.4 LBS | BODY MASS INDEX: 28.57 KG/M2 | TEMPERATURE: 96.3 F

## 2020-11-13 PROCEDURE — 93798 PHYS/QHP OP CAR RHAB W/ECG: CPT

## 2020-11-16 ENCOUNTER — HOSPITAL ENCOUNTER (OUTPATIENT)
Dept: CARDIAC REHAB | Age: 55
Setting detail: THERAPIES SERIES
Discharge: HOME | End: 2020-11-16
Payer: COMMERCIAL

## 2020-11-16 VITALS — TEMPERATURE: 97.7 F | WEIGHT: 183 LBS | BODY MASS INDEX: 28.66 KG/M2

## 2020-11-16 PROCEDURE — 93798 PHYS/QHP OP CAR RHAB W/ECG: CPT

## 2020-11-18 ENCOUNTER — HOSPITAL ENCOUNTER (OUTPATIENT)
Dept: CARDIAC REHAB | Age: 55
Setting detail: THERAPIES SERIES
Discharge: HOME | End: 2020-11-18
Payer: COMMERCIAL

## 2020-11-18 VITALS — WEIGHT: 182 LBS | BODY MASS INDEX: 28.51 KG/M2 | TEMPERATURE: 97.5 F

## 2020-11-18 PROCEDURE — 93798 PHYS/QHP OP CAR RHAB W/ECG: CPT

## 2020-11-20 ENCOUNTER — HOSPITAL ENCOUNTER (OUTPATIENT)
Dept: CARDIAC REHAB | Age: 55
Setting detail: THERAPIES SERIES
Discharge: HOME | End: 2020-11-20
Payer: COMMERCIAL

## 2020-11-20 VITALS — WEIGHT: 184 LBS | TEMPERATURE: 96.8 F | BODY MASS INDEX: 28.82 KG/M2

## 2020-11-20 PROCEDURE — 93798 PHYS/QHP OP CAR RHAB W/ECG: CPT

## 2020-11-23 ENCOUNTER — HOSPITAL ENCOUNTER (OUTPATIENT)
Dept: CARDIAC REHAB | Age: 55
Setting detail: THERAPIES SERIES
Discharge: HOME | End: 2020-11-23
Payer: COMMERCIAL

## 2020-11-23 VITALS — BODY MASS INDEX: 28.98 KG/M2 | TEMPERATURE: 97.6 F | WEIGHT: 185 LBS

## 2020-11-23 PROCEDURE — 93798 PHYS/QHP OP CAR RHAB W/ECG: CPT

## 2020-11-25 ENCOUNTER — HOSPITAL ENCOUNTER (OUTPATIENT)
Dept: CARDIAC REHAB | Age: 55
Setting detail: THERAPIES SERIES
Discharge: HOME | End: 2020-11-25
Payer: COMMERCIAL

## 2020-11-25 VITALS — BODY MASS INDEX: 28.98 KG/M2 | WEIGHT: 185 LBS | TEMPERATURE: 98 F

## 2020-11-25 PROCEDURE — 93798 PHYS/QHP OP CAR RHAB W/ECG: CPT

## 2020-11-30 ENCOUNTER — HOSPITAL ENCOUNTER (OUTPATIENT)
Dept: CARDIAC REHAB | Age: 55
Setting detail: THERAPIES SERIES
Discharge: HOME | End: 2020-11-30
Payer: COMMERCIAL

## 2020-11-30 VITALS — TEMPERATURE: 96.5 F | BODY MASS INDEX: 28.66 KG/M2 | WEIGHT: 183 LBS

## 2020-11-30 PROCEDURE — 93798 PHYS/QHP OP CAR RHAB W/ECG: CPT

## 2020-12-02 ENCOUNTER — HOSPITAL ENCOUNTER (OUTPATIENT)
Dept: CARDIAC REHAB | Age: 55
Setting detail: THERAPIES SERIES
Discharge: HOME | End: 2020-12-02
Payer: COMMERCIAL

## 2020-12-02 VITALS — TEMPERATURE: 97.1 F | BODY MASS INDEX: 28.35 KG/M2 | WEIGHT: 181 LBS

## 2020-12-02 PROCEDURE — 93798 PHYS/QHP OP CAR RHAB W/ECG: CPT

## 2020-12-04 ENCOUNTER — HOSPITAL ENCOUNTER (OUTPATIENT)
Dept: CARDIAC REHAB | Age: 55
Setting detail: THERAPIES SERIES
Discharge: HOME | End: 2020-12-04
Payer: COMMERCIAL

## 2020-12-04 VITALS — BODY MASS INDEX: 28.19 KG/M2 | TEMPERATURE: 96.3 F | WEIGHT: 180 LBS

## 2020-12-04 PROCEDURE — 93798 PHYS/QHP OP CAR RHAB W/ECG: CPT

## 2020-12-07 ENCOUNTER — HOSPITAL ENCOUNTER (OUTPATIENT)
Dept: CARDIAC REHAB | Age: 55
Setting detail: THERAPIES SERIES
Discharge: HOME | End: 2020-12-07
Payer: COMMERCIAL

## 2020-12-07 VITALS — TEMPERATURE: 96.1 F | WEIGHT: 178.1 LBS | BODY MASS INDEX: 27.89 KG/M2

## 2020-12-07 PROCEDURE — 93798 PHYS/QHP OP CAR RHAB W/ECG: CPT

## 2020-12-07 ASSESSMENT — PATIENT HEALTH QUESTIONNAIRE - PHQ9
SUM OF ALL RESPONSES TO PHQ QUESTIONS 1-9: 1
SUM OF ALL RESPONSES TO PHQ QUESTIONS 1-9: 1

## 2020-12-09 ENCOUNTER — HOSPITAL ENCOUNTER (OUTPATIENT)
Dept: CARDIAC REHAB | Age: 55
Setting detail: THERAPIES SERIES
Discharge: HOME | End: 2020-12-09
Payer: COMMERCIAL

## 2020-12-09 VITALS — BODY MASS INDEX: 27.78 KG/M2 | HEIGHT: 67 IN | WEIGHT: 177 LBS | TEMPERATURE: 96.1 F

## 2020-12-09 PROCEDURE — 93798 PHYS/QHP OP CAR RHAB W/ECG: CPT

## 2024-12-02 ENCOUNTER — APPOINTMENT (OUTPATIENT)
Dept: GENERAL RADIOLOGY | Age: 59
End: 2024-12-02
Payer: OTHER GOVERNMENT

## 2024-12-02 ENCOUNTER — HOSPITAL ENCOUNTER (EMERGENCY)
Age: 59
Discharge: HOME | End: 2024-12-02
Attending: EMERGENCY MEDICINE
Payer: OTHER GOVERNMENT

## 2024-12-02 VITALS
SYSTOLIC BLOOD PRESSURE: 138 MMHG | HEART RATE: 75 BPM | BODY MASS INDEX: 24.25 KG/M2 | WEIGHT: 160 LBS | HEIGHT: 68 IN | RESPIRATION RATE: 18 BRPM | TEMPERATURE: 98.4 F | OXYGEN SATURATION: 99 % | DIASTOLIC BLOOD PRESSURE: 83 MMHG

## 2024-12-02 DIAGNOSIS — S20.211A RIB CONTUSION, RIGHT, INITIAL ENCOUNTER: Primary | ICD-10-CM

## 2024-12-02 PROCEDURE — 6370000000 HC RX 637 (ALT 250 FOR IP): Performed by: NURSE PRACTITIONER

## 2024-12-02 PROCEDURE — 99283 EMERGENCY DEPT VISIT LOW MDM: CPT

## 2024-12-02 PROCEDURE — 71101 X-RAY EXAM UNILAT RIBS/CHEST: CPT

## 2024-12-02 RX ORDER — IBUPROFEN 600 MG/1
600 TABLET, FILM COATED ORAL ONCE
Status: COMPLETED | OUTPATIENT
Start: 2024-12-02 | End: 2024-12-02

## 2024-12-02 RX ADMIN — IBUPROFEN 600 MG: 600 TABLET ORAL at 22:17

## 2024-12-02 ASSESSMENT — LIFESTYLE VARIABLES
HOW MANY STANDARD DRINKS CONTAINING ALCOHOL DO YOU HAVE ON A TYPICAL DAY: PATIENT DOES NOT DRINK
HOW OFTEN DO YOU HAVE A DRINK CONTAINING ALCOHOL: NEVER

## 2024-12-02 ASSESSMENT — PAIN SCALES - GENERAL: PAINLEVEL_OUTOF10: 6

## 2024-12-02 ASSESSMENT — PAIN DESCRIPTION - LOCATION: LOCATION: RIB CAGE

## 2024-12-03 NOTE — ED PROVIDER NOTES
as of this dictation.    EMERGENCY DEPARTMENT COURSE and DIFFERENTIAL DIAGNOSIS/MDM:   :    Vitals:    12/02/24 2132 12/02/24 2133 12/02/24 2135   BP:  138/83    Pulse: 75     Resp:   18   Temp: 98.4 °F (36.9 °C)     TempSrc: Oral     SpO2: 99%     Weight:  72.6 kg (160 lb)    Height:  1.727 m (5' 8\")        MDM  Number of Diagnoses or Management Options  Rib contusion, right, initial encounter  Diagnosis management comments:   This is a 59 y.o. male who presents to the emergency department with right rib pain from fall today.  Pt lost his footing and fell forward injuring his right ribs.  He has worsening pain with movement and deep inspiration.  Denies other injuries.    No head or neck pain.  No LOC     On exam patient is awake, alert and in no acute distress.  Respirations are even and unlabored.  His breath sounds are clear bilaterally.  He has tenderness to the anterior and lateral right lower ribs.  No crepitus.  X-ray of the right ribs/chest was ordered.  He was given Motrin for pain.    X-ray of the ribs shows no acute fracture or displacement.  No pneumothorax.  Unclear if this is a rib contusion or a muscle strain.  Patient will be given instructions to rest, apply ice and use anti-inflammatory medications.  He may follow-up with his family doctor this week.  Patient verbalized understanding of all instructions and agrees with discharge plan.    CONSULTS:  None    PROCEDURES:  Procedures    FINAL IMPRESSION      1. Rib contusion, right, initial encounter          DISPOSITION/PLAN   DISPOSITION Decision To Discharge 12/02/2024 10:39:57 PM           PATIENT REFERRED TO:  Faith Lim DO  [address] [facility] [address] [phone]    Call in 3 days  For follow-up evaluation      DISCHARGE MEDICATIONS:  New Prescriptions    No medications on file       (Please note that portions of this note were completed with a voice recognition program.  Efforts were made to edit the dictations but

## 2024-12-03 NOTE — DISCHARGE INSTRUCTIONS
You may use over-the-counter medications to help manage your pain.  Ice as often as possible.  Please call your family doctor tomorrow for follow-up next week.

## 2024-12-03 NOTE — ED NOTES
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 Has not taken any OTC prior to coming in. Pain increases with movement. Deep breathing hurts at times too.

## (undated) DEVICE — ELECTRO LUBE IS A SINGLE PATIENT USE DEVICE THAT IS INTENDED TO BE USED ON ELECTROSURGICAL ELECTRODES TO REDUCE STICKING.: Brand: KEY SURGICAL ELECTRO LUBE

## (undated) DEVICE — SUTURE VCRL SZ 1 L18IN ABSRB VLT CT-1 L36MM 1/2 CIR J741D

## (undated) DEVICE — CONTAINER,SPECIMEN,4OZ,OR STRL: Brand: MEDLINE

## (undated) DEVICE — HEADREST NEURO DONUT 7 IN

## (undated) DEVICE — DRESSING TRNSPAR W5XL4.5IN FLM SHT SEMIPERMEABLE WIND

## (undated) DEVICE — PROTECTOR ULN NRV PUR FOAM HK LOOP STRP ANATOMICALLY

## (undated) DEVICE — SOLUTION ANTIFOG VIS SYS CLEARIFY LAPSCP

## (undated) DEVICE — TRI-LUMEN FILTERED TUBE SET WITH ACTIVATED CHARCOAL FILTER: Brand: AIRSEAL

## (undated) DEVICE — CLIP INT XL YEL POLYMER HEM-O-LOK WECK

## (undated) DEVICE — GLOVE ORANGE PI 7 1/2   MSG9075

## (undated) DEVICE — STRAP,CATHETER,ELASTIC,HOOK&LOOP: Brand: MEDLINE

## (undated) DEVICE — ARM DRAPE

## (undated) DEVICE — AGENT HEMSTAT 5GM ARISTA AH

## (undated) DEVICE — DVD-R MAXWELL ROBOTIC SURGERY

## (undated) DEVICE — CHLORAPREP 26ML ORANGE

## (undated) DEVICE — SCISSOR SURG METZ CRV TIP

## (undated) DEVICE — AIRSEAL 12 MM ACCESS PORT AND PALM GRIP OBTURATOR WITH BLADELESS OPTICAL TIP, 120 MM LENGTH: Brand: AIRSEAL

## (undated) DEVICE — COVER,LIGHT HANDLE,FLX,2/PK: Brand: MEDLINE INDUSTRIES, INC.

## (undated) DEVICE — Z DISCONTINUED USE 2717540 SUTURE ABSORBABLE MONOFILAMENT 3-0 RB 1 6 IN STRATAFIX SPRL

## (undated) DEVICE — GOWN,AURORA,NONRNF,XL,30/CS: Brand: MEDLINE

## (undated) DEVICE — SHEET DRAPE FULL 70X100

## (undated) DEVICE — SUTURE MCRYL SZ 4-0 L18IN ABSRB UD L16MM PC-3 3/8 CIR PRIM Y845G

## (undated) DEVICE — APPLICATOR SURG XL L38CM FOR ARISTA ABSRB HEMSTAT FLEXITIP

## (undated) DEVICE — CATHETER URETH 18FR BLLN 30CC 2 W F INF CTRL BARDX

## (undated) DEVICE — 40580 - THE PINK PAD - ADVANCED TRENDELENBURG POSITIONING KIT: Brand: 40580 - THE PINK PAD - ADVANCED TRENDELENBURG POSITIONING KIT

## (undated) DEVICE — SUTURE V-LOC 180 SZ 0 L9IN ABSRB GRN GS-21 L37MM 1/2 CIR VLOCL0346

## (undated) DEVICE — TOWEL,OR,DSP,ST,NATURAL,DLX,4/PK,20PK/CS: Brand: MEDLINE

## (undated) DEVICE — GARMENT,MEDLINE,DVT,INT,CALF,MED, GEN2: Brand: MEDLINE

## (undated) DEVICE — METER,URINE,400ML,DRAIN BAG,L/F,LL: Brand: MEDLINE

## (undated) DEVICE — Device

## (undated) DEVICE — TIP COVER ACCESSORY

## (undated) DEVICE — SUTURE MCRYL SZ 3-0 L27IN ABSRB VLT L17MM RB-1 1/2 CIR Y305H

## (undated) DEVICE — GLOVE ORANGE PI 7   MSG9070

## (undated) DEVICE — BLADELESS OBTURATOR: Brand: WECK VISTA

## (undated) DEVICE — CANNULA SEAL